# Patient Record
Sex: FEMALE | Race: WHITE | Employment: STUDENT | URBAN - METROPOLITAN AREA
[De-identification: names, ages, dates, MRNs, and addresses within clinical notes are randomized per-mention and may not be internally consistent; named-entity substitution may affect disease eponyms.]

---
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
## 2022-09-24 NOTE — TELEPHONE ENCOUNTER
FUTURE VISIT INFORMATION      FUTURE VISIT INFORMATION:    Date: 12.20.22    Time: 9:00    [place] [facility] [place]  REFERRAL INFORMATION:    Referring provider:  Self    Reason for visit/diagnosis  allergy consult / recs in [facility] / per Pt., Sirisha    RECORDS REQUESTED FROM:       Clinic name Comments Records Status   Family Practice 12.24.21  [facility]   Derm 7.10.18  [facility]

## 2022-12-18 NOTE — PROGRESS NOTES
[facility] Dermato-allergology Note  Office visit  Encounter Date: Dec 20, 2022  ____________________________________________    CC: No chief complaint on file.      HPI:  (Dec 20, 2022)  Ms. Sirisha Mauricio is a(n) 22 year old female who presents today as new patient for allergy consultation  - last allergy tests about 15 years ago --> then  Grass pollen allergy positive  - takes now Zyrtec every night (sometimes Allegra or Claritine) and sudafed almost daily  --> manageable during the year with antihistamines, but not in summer, then severe symptoms in ENT area  - otherwise feeling well in usual state of health    Physical exam:  General: In no acute distress, well-developed, well-nourished  Eyes: no conjunctivitis  ENT: no signs of rhinitis   Pulmonary: no wheezing or coughing  Skin: Focused examination of the skin on test sites was performed = see test results below  Some flexural dermatitis on elbow's    Past Medical History:   Patient Active Problem List   Diagnosis     Vitiligo     Pre-syncope     Intrinsic eczema     No past medical history on file.    Allergies:  No Known Allergies    Medications:  Current Outpatient Medications   Medication     augmented betamethasone dipropionate (DIPROLENE-AF) 0.05 % external ointment     cetirizine (ZYRTEC) 10 MG tablet     crisaborole (EUCRISA) 2 % ointment     Pseudoephedrine HCl (SUDAFED PO)     triamcinolone (KENALOG) 0.1 % external cream     No current facility-administered medications for this visit.       Social History:  The patient is a student (history --> will study in [employer]). Patient has the following hobbies or non-occupational exposure: none    Family History:  Family History   Problem Relation Age of Onset     Allergies Mother      Allergies Maternal Grandmother      Allergies Maternal Grandfather      Allergies Brother      Allergies Sister      Thyroid Disease Sister      Allergies Brother      Melanoma No family hx of        Previous  Labs, Allergy Tests, Dermatopathology, Imaging:  none    Referred By: Referred Self, MD  No address on file     Allergy Tests:    Past Allergy Test    Order for Future Allergy Testing:    [] Outpatient  [] Inpatient: [facility]..../ Bed ....       Skin Atopy (atopic dermatitis) [x] Yes   [] No since childhood atopic dermatitis  Contact allergies:   [] Yes   [x] No ..........  Hand eczema:   [x] Yes   [] No back of hands           Leading hand:   [] R   [] L       [] Ambidextrous         Drug allergies:        [] Yes   [x] No  which?......    Urticaria/Angioedema  [] Yes   [x] No .........  Food Allergy:  [] Yes   [x] No  which?......  Pets :  [] Yes   [x] No  Which?.no problems touching pets         [x]  Rhinitis   [x] Conjunctivitis   [x] Sinusitis   [] Polyposis   [] Otitis   [] Pharyngitis         [x]  Postnasal drip    []  none  Operations:   [] Tonsils   [] Septum   [] Sinus   [] Polyposis        [] Asthma bronchiale   [] Coughing      [x]  none  Symptoms (mostly Rhinoconjunctivitis and Asthma) aggravated by:  Season   [] I   [] II   [] III   [] IV   [x]V   [x]VI   [x]VII   [x]VIII   []IX   []X   []XI   []XII     [x] perennial   Day time      [] morning   [] noon      [] evening        [] night    [x] whole day........  []  none  Location/changes    [] inside        [] outside   [] mountains    [] sea     [] others.............   [x]  none  Triggers, specific     [] animals     [] plants     [] dust              [] others ...........................    [x]  none  Triggers, others       [] work          [] psyche    [] sport            [] others .............................  [x]  none  Irritant                [] phys efforts [] smoke    [] heat/cold     [] odors  []others............... [x]  none    Order for PATCH TESTS  Reason for tests (suspected allergy): not necessary  Known previous allergies: none  Standardized panels  [] Standard panel (40 tests)  [] Preservatives & Antimicrobials (31 tests)  [] Emulsifiers &  Additives (25 tests)   [] Perfumes/Flavours & Plants (25 tests)  [] Hairdresser panel (12 tests)  [] Rubber Chemicals (22 tests)  [] Plastics (26 tests)  [] Colorants/Dyes/Food additives (20 tests)  [] Metals (implants/dental) (24 tests)  [] Local anaesthetics/NSAIDs (13 tests)  [] Antibiotics & Antimycotics (14 tests)   [] Corticosteroids (15 tests)   [] Photopatch test (62 tests)   [] others: ...      [] Patient's own products: ...    DO NOT test if chemical or biological identity is unknown!     always ask from patient the product information and safety sheets (MSDS)       Order for PRICK TESTS    Reason for tests (suspected allergy): perennial and seasonal RC  Known previous allergies: none    Standardized prick panels  [x] Atopic panel (20 tests)  [] Pediatric Panel (12 tests)  [] Milk, Meat, Eggs, Grains (20 tests)   [] Dust, Epithelia, Feathers (10 tests)  [] Fish, Seafood, Shellfish (17 tests)  [] Nuts, Beans (14 tests)  [] Spice, Vegetable, Fruit (17 tests)  [] Pollen Panel = Tree, Grass, Weed (24 tests)  [] Others: ...      [] Patient's own products: ...    DO NOT test if chemical or biological identity is unknown!     always ask from patient the product information and safety sheets (MSDS)     Standardized intradermal tests  [x] Penicillium notatum [x] Aspergillus fumigatus [x] House dust mites D.far & D. pteron  [] Cat    [] dog  [] Others: ...  [] Bee venom   [] Wasp venom  !!Specific protocol with dilutions!!       Order for Drug allergy tests (prick & Intradermal & patch tests)    [] Penicillin G  [] Ampicillin [] Cefazolin   [] Ceftriaxone   [] Ceftazidime  [] Bactrim    [] Others: ...  Order for ... as test date    Atopy Screen (Placed Dec 20, 2022)  No Substance Readings (15 min) Evaluation   POS Histamine 1mg/ml ++    NEG NaCl 0.9% -      No Substance Readings (15 min) Evaluation   1 Alternaria alternata (tenuis)  -    2 Cladosporium herbarum -    3 Aspergillus fumigatus -    4 Penicillium notatum  -    5 Dermatophagoides pteronyssinus -    6 Dermatophagoides farinae -    7 Dog epithelium (canis spp) -    8 Cat hair (charisse catus) -    9 Cockroach   (Blatella americana & germanica) -    10 Grass mix midwest   (Amanda, Orchard, Redtop, Robbie) +/++    11 Tawanda grass (sorghum halepense) -    12 Weed mix   (common Cocklebur, Lamb s quarters, rough redroot Pigweed, Dock/Sorrel) -    13 Mug wort (artemisia vulgare) -    14 Ragweed giant/short (ambrosia spp) -    15 White birch (Betula papyrifera) -    16 Tree mix 1 (Pecan, Maple BHR, Oak RVW, american [place], black Cabins) -    17 Red cedar (juniperus virginia) -    18 Tree mix 2   (white Diaz, river/red Birch, black Hanna, common Still[place], american Elm) -    19 Box elder/Maple mix (acer spp) -    20 Warners shagbark (carya ovata) -           Conclusion: sensitization to grass pollens, that would explain the problems in summer      Intradermal Testing (Placed Dec 20, 2022)  No Substance Conc.  Reading (15min)  immediate Papule [mm] / Erythema [mm] Reading   (... days)  delayed Papule [mm] / Erythema [mm] Remarks   DF Standard Dust Mite - D. Farinae 1:10 -   -    DP Standard Dust Mite - D. Pteronyssinus 1:10 -   -    A Aspergillus fumigatus  1:10 -   -    P Penicillium notatum 1:10 -   -    Conclusions:no immediate type reactions to the dust mites and molds. Patient will observe if any delayed reaction        ________________________________    Assessment & Plan:    ==> Final Diagnosis:     # atopic predisposition with    Perennial Rhinoconjunctivitis, Rhinosinusitis and postnasal drip = no clear signs for allergy to molds and dust mites    Seasonal aggravation from Justa to August = some sensitization to grass pollens    Atopic dermatitis  * chronic illness with exacerbation, progression, side effects from treatment    These conclusions are made at the best of one's knowledge and belief based on the provided evidence such as patient's history and allergy test  results and they can change over time or can be incomplete because of missing information's.    ==> Treatment Plan:  >> on atopic dermatitis about 2xweekly the Betamethasone and in between use regularly Eucrisa  >> if delayed reaction to the dust mites and molds have to discuss this with another consult  >> if all negative, might need an ENT evaluation    Procedures Performed: Allergy tests, including prick, intradermal tests    Staff:  Provider    Follow-up in Derm-Allergy clinic if necessary (if delayed type reaction)    I spent a total of 40 minutes with Sirisha Mauricio. This time was spent counseling the patient and/or coordinating care, explaining the allergy tests, performing allergy tests and assessing the clinical relevance.

## 2022-12-20 ENCOUNTER — PRE VISIT (OUTPATIENT)
Dept: ALLERGY | Facility: CLINIC | Age: 22
End: 2022-12-20

## 2022-12-20 ENCOUNTER — OFFICE VISIT (OUTPATIENT)
Dept: ALLERGY | Facility: CLINIC | Age: 22
End: 2022-12-20
Payer: COMMERCIAL

## 2022-12-20 DIAGNOSIS — H10.10 SEASONAL AND PERENNIAL ALLERGIC RHINOCONJUNCTIVITIS: ICD-10-CM

## 2022-12-20 DIAGNOSIS — J30.89 SEASONAL AND PERENNIAL ALLERGIC RHINOCONJUNCTIVITIS: ICD-10-CM

## 2022-12-20 DIAGNOSIS — R09.82 ALLERGIC RHINITIS WITH POSTNASAL DRIP: ICD-10-CM

## 2022-12-20 DIAGNOSIS — J30.2 SEASONAL AND PERENNIAL ALLERGIC RHINOCONJUNCTIVITIS: ICD-10-CM

## 2022-12-20 DIAGNOSIS — L20.89 OTHER ATOPIC DERMATITIS: Primary | ICD-10-CM

## 2022-12-20 DIAGNOSIS — J30.89 NON-SEASONAL ALLERGIC RHINITIS DUE TO OTHER ALLERGIC TRIGGER: ICD-10-CM

## 2022-12-20 DIAGNOSIS — J30.9 ALLERGIC RHINITIS WITH POSTNASAL DRIP: ICD-10-CM

## 2022-12-20 PROCEDURE — 95004 PERQ TESTS W/ALRGNC XTRCS: CPT | Performed by: DERMATOLOGY

## 2022-12-20 PROCEDURE — 99204 OFFICE O/P NEW MOD 45 MIN: CPT | Mod: 25 | Performed by: DERMATOLOGY

## 2022-12-20 PROCEDURE — 95024 IQ TESTS W/ALLERGENIC XTRCS: CPT | Performed by: DERMATOLOGY

## 2022-12-20 NOTE — Clinical Note
12/20/2022         RE: Sirisha Mauricio  [address] [place] [address]        Dear Colleague,    Thank you for referring your patient, Sirisha Mauricio, to the [facility] [place]. Please see a copy of my visit note below.    [facility] Dermato-allergology Note  Office visit  Encounter Date: Dec 20, 2022  ____________________________________________    CC: No chief complaint on file.      HPI:  (Dec 20, 2022)  Ms. Sirisha Mauricio is a(n) 22 year old female who presents today as new patient for allergy consultation  - last allergy tests about 15 years ago --> then  Grass pollen allergy positive  - takes now Zyrtec every night (sometimes Allegra or Claritine) and sudafed almost daily  --> manageable during the year with antihistamines, but not in summer, then severe symptoms in ENT area  - otherwise feeling well in usual state of health    Physical exam:  General: In no acute distress, well-developed, well-nourished  Eyes: no conjunctivitis  ENT: no signs of rhinitis   Pulmonary: no wheezing or coughing  Skin: Focused examination of the skin on test sites was performed = see test results below  Some flexural dermatitis on elboes    Past Medical History:   Patient Active Problem List   Diagnosis     Vitiligo     Pre-syncope     Intrinsic eczema     No past medical history on file.    Allergies:  No Known Allergies    Medications:  Current Outpatient Medications   Medication     augmented betamethasone dipropionate (DIPROLENE-AF) 0.05 % external ointment     cetirizine (ZYRTEC) 10 MG tablet     crisaborole (EUCRISA) 2 % ointment     Pseudoephedrine HCl (SUDAFED PO)     triamcinolone (KENALOG) 0.1 % external cream     No current facility-administered medications for this visit.       Social History:  The patient is a student (history --> will study in [employer]). Patient has the following hobbies or non-occupational exposure: none    Family History:  Family History   Problem  Relation Age of Onset     Allergies Mother      Allergies Maternal Grandmother      Allergies Maternal Grandfather      Allergies Brother      Allergies Sister      Thyroid Disease Sister      Allergies Brother      Melanoma No family hx of        Previous Labs, Allergy Tests, Dermatopathology, Imaging:  ***    Referred By: Referred Self, MD  No address on file     Allergy Tests:    Past Allergy Test    Order for Future Allergy Testing:    [] Outpatient  [] Inpatient: [facility]..../ Bed ....       Skin Atopy (atopic dermatitis) [x] Yes   [] No since childhood atopic dermatitis  Contact allergies:   [] Yes   [x] No ..........  Hand eczema:   [x] Yes   [] No back of hands           Leading hand:   [] R   [] L       [] Ambidextrous         Drug allergies:        [] Yes   [x] No  which?......    Urticaria/Angioedema  [] Yes   [x] No .........  Food Allergy:  [] Yes   [x] No  which?......  Pets :  [] Yes   [x] No  Which?.no problems touching pets         [x]  Rhinitis   [x] Conjunctivitis   [x] Sinusitis   [] Polyposis   [] Otitis   [] Pharyngitis         [x]  Postnasal drip    []  none  Operations:   [] Tonsils   [] Septum   [] Sinus   [] Polyposis        [] Asthma bronchiale   [] Coughing      [x]  none  Symptoms (mostly Rhinoconjunctivitis and Asthma) aggravated by:  Season   [] I   [] II   [] III   [] IV   [x]V   [x]VI   [x]VII   [x]VIII   []IX   []X   []XI   []XII     [x] perennial   Day time      [] morning   [] noon      [] evening        [] night    [x] whole day........  []  none  Location/changes    [] inside        [] outside   [] mountains    [] sea     [] others.............   [x]  none  Triggers, specific     [] animals     [] plants     [] dust              [] others ...........................    [x]  none  Triggers, others       [] work          [] psyche    [] sport            [] others .............................  [x]  none  Irritant                [] phys efforts [] smoke    [] heat/cold     [] odors   []others............... [x]  none    Order for PATCH TESTS  Reason for tests (suspected allergy): not necessary  Known previous allergies: none  Standardized panels  [] Standard panel (40 tests)  [] Preservatives & Antimicrobials (31 tests)  [] Emulsifiers & Additives (25 tests)   [] Perfumes/Flavours & Plants (25 tests)  [] Hairdresser panel (12 tests)  [] Rubber Chemicals (22 tests)  [] Plastics (26 tests)  [] Colorants/Dyes/Food additives (20 tests)  [] Metals (implants/dental) (24 tests)  [] Local anaesthetics/NSAIDs (13 tests)  [] Antibiotics & Antimycotics (14 tests)   [] Corticosteroids (15 tests)   [] Photopatch test (62 tests)   [] others: ...      [] Patient's own products: ...    DO NOT test if chemical or biological identity is unknown!     always ask from patient the product information and safety sheets (MSDS)       Order for PRICK TESTS    Reason for tests (suspected allergy): perennial and seasonal RC  Known previous allergies: none    Standardized prick panels  [x] Atopic panel (20 tests)  [] Pediatric Panel (12 tests)  [] Milk, Meat, Eggs, Grains (20 tests)   [] Dust, Epithelia, Feathers (10 tests)  [] Fish, Seafood, Shellfish (17 tests)  [] Nuts, Beans (14 tests)  [] Spice, Vegetable, Fruit (17 tests)  [] Pollen Panel = Tree, Grass, Weed (24 tests)  [] Others: ...      [] Patient's own products: ...    DO NOT test if chemical or biological identity is unknown!     always ask from patient the product information and safety sheets (MSDS)     Standardized intradermal tests  [x] Penicillium notatum [x] Aspergillus fumigatus [x] House dust mites D.far & D. pteron  [] Cat    [] dog  [] Others: ...  [] Bee venom   [] Wasp venom  !!Specific protocol with dilutions!!       Order for Drug allergy tests (prick & Intradermal & patch tests)    [] Penicillin G  [] Ampicillin [] Cefazolin   [] Ceftriaxone   [] Ceftazidime  [] Bactrim    [] Others: ...  Order for ... as test date    Atopy Screen (Placed Dec 20,  2022)  No Substance Readings (15 min) Evaluation   POS Histamine 1mg/ml ++    NEG NaCl 0.9% -      No Substance Readings (15 min) Evaluation   1 Alternaria alternata (tenuis)  -    2 Cladosporium herbarum -    3 Aspergillus fumigatus -    4 Penicillium notatum -    5 Dermatophagoides pteronyssinus -    6 Dermatophagoides farinae -    7 Dog epithelium (canis spp) -    8 Cat hair (charisse catus) -    9 Cockroach   (Blatella americana & germanica) -    10 Grass mix midwest   (Amanda, Orchard, Redtop, Robbie) +/++    11 Tawanda grass (sorghum halepense) -    12 Weed mix   (common Cocklebur, Lamb s quarters, rough redroot Pigweed, Dock/Sorrel) -    13 Mug wort (artemisia vulgare) -    14 Ragweed giant/short (ambrosia spp) -    15 White birch (Betula papyrifera) -    16 Tree mix 1 (Pecan, Maple BHR, Oak RVW, american [place], black [place]) -    17 Red cedar (juniperus virginia) -    18 Tree mix 2   (white Diaz, river/red Birch, black Skytop, common Letart, american Elm) -    19 Box elder/Maple mix (acer spp) -    20 Louisa shagbark (carya ovata) -           Conclusion: sensitization to grass pollens, that would explain the problems in summer      Intradermal Testing (Placed Dec 20, 2022)  No Substance Conc.  Reading (15min)  immediate Papule [mm] / Erythema [mm] Reading   (... days)  delayed Papule [mm] / Erythema [mm] Remarks   DF Standard Dust Mite - D. Farinae 1:10 -   -    DP Standard Dust Mite - D. Pteronyssinus 1:10 -   -    A Aspergillus fumigatus  1:10 -   -    P Penicillium notatum 1:10 -   -      1:10 -   -      1:10 -   -    Conclusions:no immediate type reactions to the dust mites and molds. Patient will observe if any delayed reaction        ________________________________    Assessment & Plan:    ==> Final Diagnosis:     # atopic predisposition with    Perennial Rhinoconjunctivitis, Rhinosinusitis and postnasal drip = no clear signs for allergy to molds and dust mites    Seasonal aggravation from Justa to  August = some sensitization to grass pollens    Atopic dermatitis  {[id number]}    These conclusions are made at the best of one's knowledge and belief based on the provided evidence such as patient's history and allergy test results and they can change over time or can be incomplete because of missing information's.    ==> Treatment Plan:  >> on atopic dermatitis about 2xweekly the Betamethasone and in between use regularly Eucrisa  >> if delayed reaction to the dust mites and molds have to discuss this with another consult  >> if all negative, might need an ENT evaluation    Procedures Performed: Allergy tests, including prick, intradermal and patch tests, drug allergy or provocation tests***    {[id number]} Provider    Follow-up in Derm-Allergy clinic ***    I spent a total of 40 minutes with Sirisha Mauricio. This time was spent counseling the patient and/or coordinating care, explaining the allergy tests, performing allergy tests and assessing the clinical relevance.        Again, thank you for allowing me to participate in the care of your patient.        Sincerely,        Earl Brown MD

## 2022-12-20 NOTE — NURSING NOTE
Chief Complaint   Patient presents with     Allergy Consult     Pt is here for an allergy consult. Self referral. Pt hoping for allergy testing - reports systemic allergies and eczema year round. Hx allergy testing positive to grasses many years ago. Took allegra and sudafed for flight Friday, otherwise no antihistamine use x1 week.      Arti VAUGHN RN

## 2023-01-22 ENCOUNTER — HEALTH MAINTENANCE LETTER (OUTPATIENT)
Age: 23
End: 2023-01-22

## 2023-05-26 NOTE — PROGRESS NOTES
Chief Complaint   Patient presents with     Ent Problem     C/o constant sneezing, headaches, watery eyes - worse in the spring and summer- allergy testing done 12/20/22- patient has never had sinus infection to her knowledge     History of Present Illness   Sirisha Mauricio is a 22 year old female who presents today for evaluation.  The patient describes symptoms of runny nose, lacrimation, sneezing followed by significant headache and face pressure as well as lethargy.  The symptoms will last the better part of the day.  The patient will then have normalization of symptoms by the following day most of the time.  She denies any significant nasal obstruction.  She does have intermittent rhinorrhea.  She denies any significant postnasal drainage, taste or smell disturbance, or previous nose or sinus surgery.  No personal history of migraine headache, there is a family history of migraine.  She underwent allergy evaluation with some grass pollens on her allergy testing.  She is been taking daily antihistamine without much benefit.  She is been intermittently using the topical nasal antihistamine without much benefit.  She does seem to get benefit when she takes Sudafed with acute symptoms.  She has not had any nose or sinus imaging.  There is a family history of nasal polyps.      Past Medical History  Patient Active Problem List   Diagnosis     Vitiligo     Pre-syncope     Intrinsic eczema     Current Medications     Current Outpatient Medications:      cetirizine (ZYRTEC) 10 MG tablet, Take 10 mg by mouth daily, Disp: , Rfl:      crisaborole (EUCRISA) 2 % ointment, Apply topically 2 times daily, Disp: 60 g, Rfl: 3     Pseudoephedrine HCl (SUDAFED PO), Take by mouth daily, Disp: , Rfl:      SUMAtriptan (IMITREX) 50 MG tablet, Take 1 tablet (50 mg) by mouth at onset of headache for migraine May repeat in 2 hours. Max 4 tablets/24 hours., Disp: 9 tablet, Rfl: 1    Allergies  No Known Allergies    Social History   Social  History     Socioeconomic History     Marital status: Single   Tobacco Use     Smoking status: Never     Smokeless tobacco: Never   Vaping Use     Vaping status: Never Used   Substance and Sexual Activity     Alcohol use: No     Drug use: No     Sexual activity: Never       Family History  Family History   Problem Relation Age of Onset     Allergies Mother      Allergies Sister      Thyroid Disease Sister      Allergies Brother      Allergies Brother      Allergies Maternal Grandmother      Asthma Maternal Grandmother      Allergies Maternal Grandfather      Asthma Maternal Grandfather      Melanoma No family hx of        Review of Systems  As per HPI and PMHx, otherwise 10+ comprehensive system review is negative.    Physical Exam  /71 (BP Location: Right arm, Patient Position: Chair, Cuff Size: Adult Regular)   Pulse 85   Temp 98.7  F (37.1  C) (Tympanic)   Wt 56.7 kg (125 lb)   BMI 20.68 kg/m    GENERAL: Patient is a pleasant, cooperative 22 year old female in no acute distress.  HEAD: Normocephalic, atraumatic.  Hair and scalp are normal.  EYES: Pupils are equal, round, reactive to light and accommodation.  Extraocular movements are intact.  The sclera nonicteric without injection.  The extraocular structures are normal.  EARS: Normal shape and symmetry.  No tenderness when palpating the mastoid or tragal areas bilaterally.  Otoscopic exam reveals a minimal amount of cerumen bilaterally.  The bilateral tympanic membranes are round, intact without evidence of effusion, good landmarks.  No retraction, granulation, or drainage.  NOSE: Nares are patent.  Nasal mucosa is pink and moist.  Negative anterior rhinoscopy.  NEUROLOGIC: Cranial nerves II through XII are grossly intact.  Voice is strong.  Patient is House-Brackmann I/VI bilaterally.  CARDIOVASCULAR: Extremities are warm and well-perfused.  No significant peripheral edema.  RESPIRATORY: Patient has nonlabored breathing without cough, wheeze,  stridor.  PSYCHIATRIC: Patient is alert and oriented.  Mood and affect appear normal.  SKIN: Warm and dry.  No scalp, face, or neck lesions noted.    Procedure: Flexible Nasal Endoscopy  Indication: Intermittent runny nose, pressure, congestion    To best visualize the sinonasal anatomy and due to the chief complaint and HPI, I proceeded with flexible fiberoptic nasal endoscopy.  The bilateral nasal cavities were anesthetized and decongested with a mixture of lidocaine and neosynephrine.  The bilateral nasal cavities were then examined using flexible fiberoptic nasal endoscope.  The right nasal cavity was without masses, polyps, or mucopurulence.  The right middle turbinate and middle meatus was normal in appearance.  The sphenoethmoid recess was clear.  The left nasal cavity was without masses, polyps, or mucopurulence.  The left middle turbinate and middle meatus was normal in appearance.  The sphenoethmoid recess was clear.  The sinonasal mucosa appeared normal.  The nasal septum is midline.  The nasopharynx had a normal appearance with normal Eustachian tube openings and fossa of Rosenmuller bilaterally.  Minimal adenoid tissue.  The scope was removed.  The patient tolerated the procedure well.    Assessment and Plan     ICD-10-CM    1. Runny nose  R09.89 NASAL ENDOSCOPY, DIAGNOSTIC     SUMAtriptan (IMITREX) 50 MG tablet     Adult Neurology  Referral      2. Watery eyes  H04.203 NASAL ENDOSCOPY, DIAGNOSTIC     SUMAtriptan (IMITREX) 50 MG tablet     Adult Neurology  Referral      3. Sneezing  R06.7 NASAL ENDOSCOPY, DIAGNOSTIC     SUMAtriptan (IMITREX) 50 MG tablet     Adult Neurology  Referral      4. Facial pressure  R44.8       5. Nonintractable episodic headache, unspecified headache type  R51.9         It was my pleasure seeing Sirisha Mauricio today in clinic.  The patient presents with episodic runny nose, watery eyes, sneezing, face pain and pressure.  The periodicity and the  pattern of symptoms on a near weekly basis do not seem to fit with a sinus or allergy diagnosis.  It is possible that she could be having runny nose, nasal congestion, watery eyes as a prodrome or migraine aura which culminates in headaches/facial pressure that last the better part of the day and then resolved by the following day.  Symptoms do not seem to have a seasonal variability.  Symptoms have been attributed to allergies but allergy interventions have not significantly helped in the past.     I do think I would recommend CT imaging of the sinuses prior to any treatment with symptoms to see if there is any obvious sinus component.  We discussed a trial of Imitrex at the time of symptom onset they can be repeated 2 hours later if not improved.  I did place a referral to neurology for further evaluation as this is likely a headache condition.    Javier Zayas MD  Department of Otolaryngology-Head and Neck Surgery  [facility]

## 2023-05-31 ENCOUNTER — OFFICE VISIT (OUTPATIENT)
Dept: OTOLARYNGOLOGY | Facility: CLINIC | Age: 23
End: 2023-05-31
Payer: COMMERCIAL

## 2023-05-31 VITALS
SYSTOLIC BLOOD PRESSURE: 109 MMHG | BODY MASS INDEX: 20.68 KG/M2 | HEART RATE: 85 BPM | TEMPERATURE: 98.7 F | DIASTOLIC BLOOD PRESSURE: 71 MMHG | WEIGHT: 125 LBS

## 2023-05-31 DIAGNOSIS — R51.9 NONINTRACTABLE EPISODIC HEADACHE, UNSPECIFIED HEADACHE TYPE: ICD-10-CM

## 2023-05-31 DIAGNOSIS — R06.7 SNEEZING: ICD-10-CM

## 2023-05-31 DIAGNOSIS — R44.8 FACIAL PRESSURE: ICD-10-CM

## 2023-05-31 DIAGNOSIS — R09.89 RUNNY NOSE: Primary | ICD-10-CM

## 2023-05-31 DIAGNOSIS — H04.203 WATERY EYES: ICD-10-CM

## 2023-05-31 PROCEDURE — 99204 OFFICE O/P NEW MOD 45 MIN: CPT | Mod: 25 | Performed by: OTOLARYNGOLOGY

## 2023-05-31 PROCEDURE — 31231 NASAL ENDOSCOPY DX: CPT | Performed by: OTOLARYNGOLOGY

## 2023-05-31 RX ORDER — SUMATRIPTAN 50 MG/1
50 TABLET, FILM COATED ORAL
Qty: 9 TABLET | Refills: 1 | Status: SHIPPED | OUTPATIENT
Start: 2023-05-31 | End: 2023-07-03

## 2023-05-31 ASSESSMENT — PAIN SCALES - GENERAL: PAINLEVEL: NO PAIN (0)

## 2023-05-31 NOTE — LETTER
5/31/2023         RE: Sirisha Mauricio  [address] [place] [address]        Dear Colleague,    Thank you for referring your patient, Sirisha Mauricio, to the [facility]. Please see a copy of my visit note below.    Chief Complaint   Patient presents with     Ent Problem     C/o constant sneezing, headaches, watery eyes - worse in the spring and summer- allergy testing done 12/20/22- patient has never had sinus infection to her knowledge     History of Present Illness   Sirisha Mauricio is a 22 year old female who presents today for evaluation.  The patient describes symptoms of runny nose, lacrimation, sneezing followed by significant headache and face pressure as well as lethargy.  The symptoms will last the better part of the day.  The patient will then have normalization of symptoms by the following day most of the time.  She denies any significant nasal obstruction.  She does have intermittent rhinorrhea.  She denies any significant postnasal drainage, taste or smell disturbance, or previous nose or sinus surgery.  No personal history of migraine headache, there is a family history of migraine.  She underwent allergy evaluation with some grass pollens on her allergy testing.  She is been taking daily antihistamine without much benefit.  She is been intermittently using the topical nasal antihistamine without much benefit.  She does seem to get benefit when she takes Sudafed with acute symptoms.  She has not had any nose or sinus imaging.  There is a family history of nasal polyps.      Past Medical History  Patient Active Problem List   Diagnosis     Vitiligo     Pre-syncope     Intrinsic eczema     Current Medications     Current Outpatient Medications:      cetirizine (ZYRTEC) 10 MG tablet, Take 10 mg by mouth daily, Disp: , Rfl:      crisaborole (EUCRISA) 2 % ointment, Apply topically 2 times daily, Disp: 60 g, Rfl: 3     Pseudoephedrine HCl (SUDAFED PO), Take by mouth daily, Disp: ,  Rfl:      SUMAtriptan (IMITREX) 50 MG tablet, Take 1 tablet (50 mg) by mouth at onset of headache for migraine May repeat in 2 hours. Max 4 tablets/24 hours., Disp: 9 tablet, Rfl: 1    Allergies  No Known Allergies    Social History   Social History     Socioeconomic History     Marital status: Single   Tobacco Use     Smoking status: Never     Smokeless tobacco: Never   Vaping Use     Vaping status: Never Used   Substance and Sexual Activity     Alcohol use: No     Drug use: No     Sexual activity: Never       Family History  Family History   Problem Relation Age of Onset     Allergies Mother      Allergies Sister      Thyroid Disease Sister      Allergies Brother      Allergies Brother      Allergies Maternal Grandmother      Asthma Maternal Grandmother      Allergies Maternal Grandfather      Asthma Maternal Grandfather      Melanoma No family hx of        Review of Systems  As per HPI and PMHx, otherwise 10+ comprehensive system review is negative.    Physical Exam  /71 (BP Location: Right arm, Patient Position: Chair, Cuff Size: Adult Regular)   Pulse 85   Temp 98.7  F (37.1  C) (Tympanic)   Wt 56.7 kg (125 lb)   BMI 20.68 kg/m    GENERAL: Patient is a pleasant, cooperative 22 year old female in no acute distress.  HEAD: Normocephalic, atraumatic.  Hair and scalp are normal.  EYES: Pupils are equal, round, reactive to light and accommodation.  Extraocular movements are intact.  The sclera nonicteric without injection.  The extraocular structures are normal.  EARS: Normal shape and symmetry.  No tenderness when palpating the mastoid or tragal areas bilaterally.  Otoscopic exam reveals a minimal amount of cerumen bilaterally.  The bilateral tympanic membranes are round, intact without evidence of effusion, good landmarks.  No retraction, granulation, or drainage.  NOSE: Nares are patent.  Nasal mucosa is pink and moist.  Negative anterior rhinoscopy.  NEUROLOGIC: Cranial nerves II through XII are  grossly intact.  Voice is strong.  Patient is House-Brackmann I/VI bilaterally.  CARDIOVASCULAR: Extremities are warm and well-perfused.  No significant peripheral edema.  RESPIRATORY: Patient has nonlabored breathing without cough, wheeze, stridor.  PSYCHIATRIC: Patient is alert and oriented.  Mood and affect appear normal.  SKIN: Warm and dry.  No scalp, face, or neck lesions noted.    Procedure: Flexible Nasal Endoscopy  Indication: Intermittent runny nose, pressure, congestion    To best visualize the sinonasal anatomy and due to the chief complaint and HPI, I proceeded with flexible fiberoptic nasal endoscopy.  The bilateral nasal cavities were anesthetized and decongested with a mixture of lidocaine and neosynephrine.  The bilateral nasal cavities were then examined using flexible fiberoptic nasal endoscope.  The right nasal cavity was without masses, polyps, or mucopurulence.  The right middle turbinate and middle meatus was normal in appearance.  The sphenoethmoid recess was clear.  The left nasal cavity was without masses, polyps, or mucopurulence.  The left middle turbinate and middle meatus was normal in appearance.  The sphenoethmoid recess was clear.  The sinonasal mucosa appeared normal.  The nasal septum is midline.  The nasopharynx had a normal appearance with normal Eustachian tube openings and fossa of Rosenmuller bilaterally.  Minimal adenoid tissue.  The scope was removed.  The patient tolerated the procedure well.    Assessment and Plan     ICD-10-CM    1. Runny nose  R09.89 NASAL ENDOSCOPY, DIAGNOSTIC     SUMAtriptan (IMITREX) 50 MG tablet     Adult Neurology  Referral      2. Watery eyes  H04.203 NASAL ENDOSCOPY, DIAGNOSTIC     SUMAtriptan (IMITREX) 50 MG tablet     Adult Neurology  Referral      3. Sneezing  R06.7 NASAL ENDOSCOPY, DIAGNOSTIC     SUMAtriptan (IMITREX) 50 MG tablet     Adult Neurology  Referral      4. Facial pressure  R44.8       5. Nonintractable  episodic headache, unspecified headache type  R51.9         It was my pleasure seeing Sirisha Mauricio today in clinic.  The patient presents with episodic runny nose, watery eyes, sneezing, face pain and pressure.  The periodicity and the pattern of symptoms on a near weekly basis do not seem to fit with a sinus or allergy diagnosis.  It is possible that she could be having runny nose, nasal congestion, watery eyes as a prodrome or migraine aura which culminates in headaches/facial pressure that last the better part of the day and then resolved by the following day.  Symptoms do not seem to have a seasonal variability.  Symptoms have been attributed to allergies but allergy interventions have not significantly helped in the past.     I do think I would recommend CT imaging of the sinuses prior to any treatment with symptoms to see if there is any obvious sinus component.  We discussed a trial of Imitrex at the time of symptom onset they can be repeated 2 hours later if not improved.  I did place a referral to neurology for further evaluation as this is likely a headache condition.    Javier Zayas MD  Department of Otolaryngology-Head and Neck Surgery  [facility]         Again, thank you for allowing me to participate in the care of your patient.        Sincerely,        Javier Zayas MD

## 2023-05-31 NOTE — NURSING NOTE
"Initial /71 (BP Location: Right arm, Patient Position: Chair, Cuff Size: Adult Regular)   Pulse 85   Temp 98.7  F (37.1  C) (Tympanic)   Wt 56.7 kg (125 lb)   BMI 20.68 kg/m   Estimated body mass index is 20.68 kg/m  as calculated from the following:    Height as of 12/24/21: 1.656 m (5' 5.2\").    Weight as of this encounter: 56.7 kg (125 lb). .    Alissa Jaramillo LPN    "

## 2023-05-31 NOTE — PATIENT INSTRUCTIONS
Per physician's instructions    RECURRENT ACUTE SINUSITIS INSTRUCTIONS    Contact office or schedule sinus CT PRIOR to receiving antibiotics.

## 2023-06-02 ENCOUNTER — MYC MEDICAL ADVICE (OUTPATIENT)
Dept: OTOLARYNGOLOGY | Facility: CLINIC | Age: 23
End: 2023-06-02
Payer: COMMERCIAL

## 2023-06-02 ENCOUNTER — HOSPITAL ENCOUNTER (OUTPATIENT)
Dept: CT IMAGING | Facility: HOSPITAL | Age: 23
Discharge: HOME | End: 2023-06-02
Attending: OTOLARYNGOLOGY | Admitting: OTOLARYNGOLOGY
Payer: COMMERCIAL

## 2023-06-02 DIAGNOSIS — R44.8 FACIAL PRESSURE: ICD-10-CM

## 2023-06-02 DIAGNOSIS — R51.9 NONINTRACTABLE EPISODIC HEADACHE, UNSPECIFIED HEADACHE TYPE: ICD-10-CM

## 2023-06-02 DIAGNOSIS — H04.203 WATERY EYES: ICD-10-CM

## 2023-06-02 DIAGNOSIS — R09.89 RUNNY NOSE: ICD-10-CM

## 2023-06-02 DIAGNOSIS — R06.7 SNEEZING: ICD-10-CM

## 2023-06-02 DIAGNOSIS — R09.89 RUNNY NOSE: Primary | ICD-10-CM

## 2023-06-02 PROCEDURE — 70486 CT MAXILLOFACIAL W/O DYE: CPT

## 2023-06-02 NOTE — TELEPHONE ENCOUNTER
Let patient know that the CT has been order as a STAT and gave her the number to call and schedule.  Patient had no other questions at this time.    Mirta Dale LPN on 6/2/2023 at 2:21 PM

## 2023-07-03 ENCOUNTER — OFFICE VISIT (OUTPATIENT)
Dept: OTOLARYNGOLOGY | Facility: CLINIC | Age: 23
End: 2023-07-03
Payer: COMMERCIAL

## 2023-07-03 VITALS
WEIGHT: 125 LBS | TEMPERATURE: 98.6 F | SYSTOLIC BLOOD PRESSURE: 119 MMHG | DIASTOLIC BLOOD PRESSURE: 69 MMHG | HEART RATE: 94 BPM | BODY MASS INDEX: 20.68 KG/M2

## 2023-07-03 DIAGNOSIS — J32.1 CHRONIC FRONTAL SINUSITIS: ICD-10-CM

## 2023-07-03 DIAGNOSIS — J34.3 HYPERTROPHY OF BOTH INFERIOR NASAL TURBINATES: ICD-10-CM

## 2023-07-03 DIAGNOSIS — R44.8 FACIAL PRESSURE: ICD-10-CM

## 2023-07-03 DIAGNOSIS — J32.2 CHRONIC ETHMOIDAL SINUSITIS: ICD-10-CM

## 2023-07-03 DIAGNOSIS — J32.0 CHRONIC MAXILLARY SINUSITIS: Primary | ICD-10-CM

## 2023-07-03 PROCEDURE — 96372 THER/PROPH/DIAG INJ SC/IM: CPT | Performed by: OTOLARYNGOLOGY

## 2023-07-03 PROCEDURE — 99214 OFFICE O/P EST MOD 30 MIN: CPT | Mod: 25 | Performed by: OTOLARYNGOLOGY

## 2023-07-03 RX ORDER — TRIAMCINOLONE ACETONIDE 40 MG/ML
80 INJECTION, SUSPENSION INTRA-ARTICULAR; INTRAMUSCULAR ONCE
Status: COMPLETED | OUTPATIENT
Start: 2023-07-03 | End: 2023-07-03

## 2023-07-03 RX ORDER — BUDESONIDE 0.5 MG/2ML
INHALANT ORAL
Qty: 360 ML | Refills: 3 | Status: SHIPPED | OUTPATIENT
Start: 2023-07-03

## 2023-07-03 RX ORDER — LORATADINE 10 MG/1
10 TABLET ORAL DAILY
COMMUNITY
End: 2023-08-07

## 2023-07-03 RX ADMIN — TRIAMCINOLONE ACETONIDE 80 MG: 40 INJECTION, SUSPENSION INTRA-ARTICULAR; INTRAMUSCULAR at 12:52

## 2023-07-03 ASSESSMENT — PAIN SCALES - GENERAL: PAINLEVEL: NO PAIN (0)

## 2023-07-03 NOTE — NURSING NOTE
"Initial /69 (BP Location: Right arm, Patient Position: Chair, Cuff Size: Adult Regular)   Pulse 94   Temp 98.6  F (37  C) (Tympanic)   Wt 56.7 kg (125 lb)   BMI 20.68 kg/m   Estimated body mass index is 20.68 kg/m  as calculated from the following:    Height as of 12/24/21: 1.656 m (5' 5.2\").    Weight as of this encounter: 56.7 kg (125 lb). .    Alissa Jaramillo LPN    "

## 2023-07-03 NOTE — LETTER
7/3/2023         RE: Sirisha Mauricio  [address] [place] [address]        Dear Colleague,    Thank you for referring your patient, Sirisha Mauricio, to the [facility]. Please see a copy of my visit note below.    Chief Complaint   Patient presents with     Ent Problem     Go over CT results     History of Present Illness  Sirisha Mauricio is a 22 year old female who presents today for follow-up.  I evaluated the patient on 5/31/2023 with a several month history of runny nose, lacrimation, sneezing followed by significant headache and face pressure as well as lethargy.  She was having issues with congestion, intermittent rhinorrhea, nasal obstruction.  She denies any significant taste or smell changes, postnasal drainage.  She was struggling with facial pressure and headache.  She does not have a personal history of migraine headache.  She was getting some benefit when she was using Sudafed.  She underwent allergy testing with minimal positives, some grass pollens.  There is family history of nasal polyps.    We discussed obtaining CT imaging when the patient's symptoms were at peak.  The patient underwent CT imaging of her sinuses on 6/2/2023.  My review of the sinus CT shows sinus mucosal thickening without significant air-fluid levels in both maxillary sinuses.  There is obstruction of the bilateral ostiomeatal units.  There is scattered moderate thickening in both ethmoid systems.  There is a slight amount of thickening in the left frontal sinus outflow tract.  The sphenoid sinuses are relatively well aerated.  The nasal septum is essentially midline.  I placed her on a 21-day course of Bactrim and a burst and taper of prednisone.  The patient had significant interval improvement in her symptoms when she was on the prednisone and the antibiotic but eventually had resurgence of her symptoms.  Today she is quite symptomatic and is struggling with congestion, nasal obstruction,  rhinorrhea.    Past Medical History  Patient Active Problem List   Diagnosis     Vitiligo     Pre-syncope     Intrinsic eczema     Current Medications    Current Outpatient Medications:      budesonide (PULMICORT) 0.5 MG/2ML neb solution, Place 0.5 mg/2 mL budesonide vial in 8 oz normal saline sinus rinse bottle.  Irrigate each nostril with one half of the bottle twice daily., Disp: 360 mL, Rfl: 3     cetirizine (ZYRTEC) 10 MG tablet, Take 10 mg by mouth daily, Disp: , Rfl:      crisaborole (EUCRISA) 2 % ointment, Apply topically 2 times daily, Disp: 60 g, Rfl: 3     loratadine (CLARITIN) 10 MG tablet, Take 10 mg by mouth daily, Disp: , Rfl:      Pseudoephedrine HCl (SUDAFED PO), Take by mouth daily, Disp: , Rfl:   No current facility-administered medications for this visit.    Allergies  No Known Allergies    Social History  Social History     Socioeconomic History     Marital status: Single     Spouse name: None     Number of children: None     Years of education: None     Highest education level: None   Tobacco Use     Smoking status: Never     Smokeless tobacco: Never   Vaping Use     Vaping Use: Never used   Substance and Sexual Activity     Alcohol use: No     Drug use: No     Sexual activity: Never       Family History  Family History   Problem Relation Age of Onset     Allergies Mother      Allergies Sister      Thyroid Disease Sister      Allergies Brother      Allergies Brother      Allergies Maternal Grandmother      Asthma Maternal Grandmother      Allergies Maternal Grandfather      Asthma Maternal Grandfather      Melanoma No family hx of        Review of Systems  As per HPI and PMHx, otherwise 10 system review including the head and neck, constitutional, eyes, respiratory, GI, skin, neurologic, lymphatic, endocrine, and allergy systems is negative.    Physical Exam  /69 (BP Location: Right arm, Patient Position: Chair, Cuff Size: Adult Regular)   Pulse 94   Temp 98.6  F (37  C) (Tympanic)    Wt 56.7 kg (125 lb)   BMI 20.68 kg/m    GENERAL: Patient is a pleasant, cooperative 22 year old female in no acute distress.  HEAD: Normocephalic, atraumatic.  Hair and scalp are normal.  EYES: Pupils are equal, round, reactive to light and accommodation.  Extraocular movements are intact.  The sclera nonicteric without injection.  The extraocular structures are normal.  EARS: Normal shape and symmetry.  No tenderness when palpating the mastoid or tragal areas bilaterally.    NOSE: Nares are patent.  Nasal mucosa is congested, boggy and inflamed with sticky, inflammatory mucus.  Moderate inferior turbinate hypertrophy.  NEUROLOGIC: Cranial nerves II through XII are grossly intact.  Voice is strong.  Patient is House-Brackmann I/VI bilaterally.  CARDIOVASCULAR: Extremities are warm and well-perfused.  No significant peripheral edema.  RESPIRATORY: Patient has nonlabored breathing without cough, wheeze, stridor.  PSYCHIATRIC: Patient is alert and oriented.  Mood and affect appear normal.  SKIN: Warm and dry.  No scalp, face, or neck lesions noted.    Assessment and Plan     ICD-10-CM    1. Chronic maxillary sinusitis  J32.0 triamcinolone (KENALOG-40) injection 80 mg     budesonide (PULMICORT) 0.5 MG/2ML neb solution      2. Chronic ethmoidal sinusitis  J32.2 triamcinolone (KENALOG-40) injection 80 mg     budesonide (PULMICORT) 0.5 MG/2ML neb solution      3. Chronic frontal sinusitis  J32.1 triamcinolone (KENALOG-40) injection 80 mg     budesonide (PULMICORT) 0.5 MG/2ML neb solution      4. Facial pressure  R44.8 triamcinolone (KENALOG-40) injection 80 mg     budesonide (PULMICORT) 0.5 MG/2ML neb solution      5. Hypertrophy of both inferior nasal turbinates  J34.3          It was my pleasure seeing Sirisha Mauricio today in clinic.  The patient presents with chronic sinusitis without nasal polyposis.  She had initial improvement after a course of antibiotics and steroids, but does have progression of her  symptoms.    We discussed the pathophysiology of chronic sinusitis.  We discussed medical and surgical management.  I feel the patient would benefit from nasal saline irrigations with Budesonide.  We discussed proper nasal saline irrigation technique with Budesonide.  The patient would also benefit from an 80 mg intramuscular injection of Kenalog injection today in office.  We discussed the risks, benefits, options, goals of a intramuscular Kenalog injection today in office including, but not limited to: Risk of pain at injection site, risk of infection, side effects of systemic steroids including blood pressure problems, insulin resistance, weight gain, bone/joint issues, mood alteration.  Patient voiced understanding and is willing to proceed.    Based on her CT imaging and persistent symptoms, she would be a candidate for endoscopic sinus surgery.    We discussed the risks, benefits, alternatives, options of bilateral endoscopic maxillary antrostomies, bilateral endoscopic total ethmoidectomies, bilateral submucous resection of the inferior turbinates, possible left endoscopic frontal sinusotomy, image guidance including, but not limited to: Risk of bleeding, risk of infection, risk of postoperative pain, risk of injury to the orbital contents, risk of CSF leak, risk of intranasal scarring or adhesions, risk of smell disturbance or smell loss, potential need for additional procedures, risk of general anesthesia.  The patient voiced understanding and is willing to proceed.  We discussed the postoperative course and convalescence including lifting and activity restrictions, nasal saline irrigations postoperatively, and postoperative debridement.    The patient would like to think about her options, she is leaving for school at the end of August and would potentially have enough time to have surgery in convalesce prior to leaving versus having her consider surgery when she returns.    Javier Zayas MD  Department  of Otolaryngology-Head and Neck Surgery  [facility]         Again, thank you for allowing me to participate in the care of your patient.        Sincerely,        Javier Zayas MD

## 2023-07-03 NOTE — PROGRESS NOTES
Chief Complaint   Patient presents with     Ent Problem     Go over CT results     History of Present Illness  Sirisha Mauricio is a 22 year old female who presents today for follow-up.  I evaluated the patient on 5/31/2023 with a several month history of runny nose, lacrimation, sneezing followed by significant headache and face pressure as well as lethargy.  She was having issues with congestion, intermittent rhinorrhea, nasal obstruction.  She denies any significant taste or smell changes, postnasal drainage.  She was struggling with facial pressure and headache.  She does not have a personal history of migraine headache.  She was getting some benefit when she was using Sudafed.  She underwent allergy testing with minimal positives, some grass pollens.  There is family history of nasal polyps.    We discussed obtaining CT imaging when the patient's symptoms were at peak.  The patient underwent CT imaging of her sinuses on 6/2/2023.  My review of the sinus CT shows sinus mucosal thickening without significant air-fluid levels in both maxillary sinuses.  There is obstruction of the bilateral ostiomeatal units.  There is scattered moderate thickening in both ethmoid systems.  There is a slight amount of thickening in the left frontal sinus outflow tract.  The sphenoid sinuses are relatively well aerated.  The nasal septum is essentially midline.  I placed her on a 21-day course of Bactrim and a burst and taper of prednisone.  The patient had significant interval improvement in her symptoms when she was on the prednisone and the antibiotic but eventually had resurgence of her symptoms.  Today she is quite symptomatic and is struggling with congestion, nasal obstruction, rhinorrhea.    Past Medical History  Patient Active Problem List   Diagnosis     Vitiligo     Pre-syncope     Intrinsic eczema     Current Medications    Current Outpatient Medications:      budesonide (PULMICORT) 0.5 MG/2ML neb solution, Place 0.5 mg/2  mL budesonide vial in 8 oz normal saline sinus rinse bottle.  Irrigate each nostril with one half of the bottle twice daily., Disp: 360 mL, Rfl: 3     cetirizine (ZYRTEC) 10 MG tablet, Take 10 mg by mouth daily, Disp: , Rfl:      crisaborole (EUCRISA) 2 % ointment, Apply topically 2 times daily, Disp: 60 g, Rfl: 3     loratadine (CLARITIN) 10 MG tablet, Take 10 mg by mouth daily, Disp: , Rfl:      Pseudoephedrine HCl (SUDAFED PO), Take by mouth daily, Disp: , Rfl:   No current facility-administered medications for this visit.    Allergies  No Known Allergies    Social History  Social History     Socioeconomic History     Marital status: Single     Spouse name: None     Number of children: None     Years of education: None     Highest education level: None   Tobacco Use     Smoking status: Never     Smokeless tobacco: Never   Vaping Use     Vaping Use: Never used   Substance and Sexual Activity     Alcohol use: No     Drug use: No     Sexual activity: Never       Family History  Family History   Problem Relation Age of Onset     Allergies Mother      Allergies Sister      Thyroid Disease Sister      Allergies Brother      Allergies Brother      Allergies Maternal Grandmother      Asthma Maternal Grandmother      Allergies Maternal Grandfather      Asthma Maternal Grandfather      Melanoma No family hx of        Review of Systems  As per HPI and PMHx, otherwise 10 system review including the head and neck, constitutional, eyes, respiratory, GI, skin, neurologic, lymphatic, endocrine, and allergy systems is negative.    Physical Exam  /69 (BP Location: Right arm, Patient Position: Chair, Cuff Size: Adult Regular)   Pulse 94   Temp 98.6  F (37  C) (Tympanic)   Wt 56.7 kg (125 lb)   BMI 20.68 kg/m    GENERAL: Patient is a pleasant, cooperative 22 year old female in no acute distress.  HEAD: Normocephalic, atraumatic.  Hair and scalp are normal.  EYES: Pupils are equal, round, reactive to light and  accommodation.  Extraocular movements are intact.  The sclera nonicteric without injection.  The extraocular structures are normal.  EARS: Normal shape and symmetry.  No tenderness when palpating the mastoid or tragal areas bilaterally.    NOSE: Nares are patent.  Nasal mucosa is congested, boggy and inflamed with sticky, inflammatory mucus.  Moderate inferior turbinate hypertrophy.  NEUROLOGIC: Cranial nerves II through XII are grossly intact.  Voice is strong.  Patient is House-Brackmann I/VI bilaterally.  CARDIOVASCULAR: Extremities are warm and well-perfused.  No significant peripheral edema.  RESPIRATORY: Patient has nonlabored breathing without cough, wheeze, stridor.  PSYCHIATRIC: Patient is alert and oriented.  Mood and affect appear normal.  SKIN: Warm and dry.  No scalp, face, or neck lesions noted.    Assessment and Plan     ICD-10-CM    1. Chronic maxillary sinusitis  J32.0 triamcinolone (KENALOG-40) injection 80 mg     budesonide (PULMICORT) 0.5 MG/2ML neb solution      2. Chronic ethmoidal sinusitis  J32.2 triamcinolone (KENALOG-40) injection 80 mg     budesonide (PULMICORT) 0.5 MG/2ML neb solution      3. Chronic frontal sinusitis  J32.1 triamcinolone (KENALOG-40) injection 80 mg     budesonide (PULMICORT) 0.5 MG/2ML neb solution      4. Facial pressure  R44.8 triamcinolone (KENALOG-40) injection 80 mg     budesonide (PULMICORT) 0.5 MG/2ML neb solution      5. Hypertrophy of both inferior nasal turbinates  J34.3          It was my pleasure seeing Sirisha Mauricio today in clinic.  The patient presents with chronic sinusitis without nasal polyposis.  She had initial improvement after a course of antibiotics and steroids, but does have progression of her symptoms.    We discussed the pathophysiology of chronic sinusitis.  We discussed medical and surgical management.  I feel the patient would benefit from nasal saline irrigations with Budesonide.  We discussed proper nasal saline irrigation technique with  Budesonide.  The patient would also benefit from an 80 mg intramuscular injection of Kenalog injection today in office.  We discussed the risks, benefits, options, goals of a intramuscular Kenalog injection today in office including, but not limited to: Risk of pain at injection site, risk of infection, side effects of systemic steroids including blood pressure problems, insulin resistance, weight gain, bone/joint issues, mood alteration.  Patient voiced understanding and is willing to proceed.    Based on her CT imaging and persistent symptoms, she would be a candidate for endoscopic sinus surgery.    We discussed the risks, benefits, alternatives, options of bilateral endoscopic maxillary antrostomies, bilateral endoscopic total ethmoidectomies, bilateral submucous resection of the inferior turbinates, possible left endoscopic frontal sinusotomy, image guidance including, but not limited to: Risk of bleeding, risk of infection, risk of postoperative pain, risk of injury to the orbital contents, risk of CSF leak, risk of intranasal scarring or adhesions, risk of smell disturbance or smell loss, potential need for additional procedures, risk of general anesthesia.  The patient voiced understanding and is willing to proceed.  We discussed the postoperative course and convalescence including lifting and activity restrictions, nasal saline irrigations postoperatively, and postoperative debridement.    The patient would like to think about her options, she is leaving for school at the end of August and would potentially have enough time to have surgery in convalesce prior to leaving versus having her consider surgery when she returns.    Javier Zayas MD  Department of Otolaryngology-Head and Neck Surgery  [facility]

## 2023-07-03 NOTE — PATIENT INSTRUCTIONS
BUDESONIDE IRRIGATION INSTRUCTIONS    You will be starting Budesonide nasal irrigations and will need to obtain the following:      - NeilMed Sinus Rinse 8 oz Kit  - Distilled or filtered water   - Normal saline salt packets  - Budesonide medication     Place filtered or distilled water into the NeilMed bottle up to the fill line (DO NOT USE TAP OR WELL WATER).  Place the pre-made salt packet in the 8 oz of saline.  Then placed the budesonide medication into the bottle.  Shake the bottle to suspend into solution.  Lean head forward over a sink or a basin.  Rinse each side of the nose with one-half of the bottle (each squeeze is about one-half of the bottle). Rinse the nose twice daily.     You will follow up with your ENT surgeon in 8-12 weeks to recheck your symptoms.  If you are having problems with your irrigations or problems obtaining the medication, please contact your ENT surgeon.    Video example: [email or web address]

## 2023-07-05 DIAGNOSIS — R44.8 FACIAL PRESSURE: ICD-10-CM

## 2023-07-05 DIAGNOSIS — J32.2 CHRONIC ETHMOIDAL SINUSITIS: ICD-10-CM

## 2023-07-05 DIAGNOSIS — J32.1 CHRONIC FRONTAL SINUSITIS: ICD-10-CM

## 2023-07-05 DIAGNOSIS — J34.3 HYPERTROPHY OF BOTH INFERIOR NASAL TURBINATES: ICD-10-CM

## 2023-07-05 DIAGNOSIS — J32.0 CHRONIC MAXILLARY SINUSITIS: Primary | ICD-10-CM

## 2023-07-06 ENCOUNTER — TELEPHONE (OUTPATIENT)
Dept: OTOLARYNGOLOGY | Facility: CLINIC | Age: 23
End: 2023-07-06

## 2023-07-06 NOTE — TELEPHONE ENCOUNTER
Type of surgery: SINUS SURGERY, ENDOSCOPIC, USING OPTICAL TRACKING SYSTEM (Bilateral)   SUBMUCOUS RESECTION OF THE INFERIOR TURBINATES (Bilateral    Location of surgery: [facility]  Date and time of surgery: 8/1  Surgeon: Marquez   Pre-Op Appt Date: 7/24  Post-Op Appt Date: 8/7   Packet sent out: Yes  Pre-cert/Authorization completed:  No  Date:

## 2023-07-06 NOTE — TELEPHONE ENCOUNTER
Left message for patient to call us back to discuss surgery dates    Alexandra Garcia M.A.  Specialty surgery Scheduler

## 2023-07-17 ENCOUNTER — OFFICE VISIT (OUTPATIENT)
Dept: FAMILY MEDICINE | Facility: CLINIC | Age: 23
End: 2023-07-17
Payer: COMMERCIAL

## 2023-07-17 VITALS
HEART RATE: 77 BPM | HEIGHT: 65 IN | RESPIRATION RATE: 16 BRPM | OXYGEN SATURATION: 99 % | TEMPERATURE: 98 F | WEIGHT: 125 LBS | DIASTOLIC BLOOD PRESSURE: 60 MMHG | SYSTOLIC BLOOD PRESSURE: 108 MMHG | BODY MASS INDEX: 20.83 KG/M2

## 2023-07-17 DIAGNOSIS — Z01.818 PRE-OP EXAM: Primary | ICD-10-CM

## 2023-07-17 DIAGNOSIS — J32.0 CHRONIC MAXILLARY SINUSITIS: ICD-10-CM

## 2023-07-17 LAB
ANION GAP SERPL CALCULATED.3IONS-SCNC: 10 MMOL/L (ref 7–15)
BUN SERPL-MCNC: 10.5 MG/DL (ref 6–20)
CALCIUM SERPL-MCNC: 9.6 MG/DL (ref 8.6–10)
CHLORIDE SERPL-SCNC: 102 MMOL/L (ref 98–107)
CREAT SERPL-MCNC: 0.68 MG/DL (ref 0.51–0.95)
DEPRECATED HCO3 PLAS-SCNC: 27 MMOL/L (ref 22–29)
ERYTHROCYTE [DISTWIDTH] IN BLOOD BY AUTOMATED COUNT: 12 % (ref 10–15)
GFR SERPL CREATININE-BSD FRML MDRD: >90 ML/MIN/1.73M2
GLUCOSE SERPL-MCNC: 115 MG/DL (ref 70–99)
HCG UR QL: NEGATIVE
HCT VFR BLD AUTO: 42.1 % (ref 35–47)
HGB BLD-MCNC: 14.7 G/DL (ref 11.7–15.7)
MCH RBC QN AUTO: 31.9 PG (ref 26.5–33)
MCHC RBC AUTO-ENTMCNC: 34.9 G/DL (ref 31.5–36.5)
MCV RBC AUTO: 91 FL (ref 78–100)
PLATELET # BLD AUTO: 297 10E3/UL (ref 150–450)
POTASSIUM SERPL-SCNC: 4 MMOL/L (ref 3.4–5.3)
RBC # BLD AUTO: 4.61 10E6/UL (ref 3.8–5.2)
SODIUM SERPL-SCNC: 139 MMOL/L (ref 136–145)
WBC # BLD AUTO: 9.7 10E3/UL (ref 4–11)

## 2023-07-17 PROCEDURE — 99214 OFFICE O/P EST MOD 30 MIN: CPT | Performed by: NURSE PRACTITIONER

## 2023-07-17 PROCEDURE — 36415 COLL VENOUS BLD VENIPUNCTURE: CPT | Performed by: NURSE PRACTITIONER

## 2023-07-17 PROCEDURE — 85027 COMPLETE CBC AUTOMATED: CPT | Performed by: NURSE PRACTITIONER

## 2023-07-17 PROCEDURE — 80048 BASIC METABOLIC PNL TOTAL CA: CPT | Performed by: NURSE PRACTITIONER

## 2023-07-17 PROCEDURE — 81025 URINE PREGNANCY TEST: CPT | Performed by: NURSE PRACTITIONER

## 2023-07-17 ASSESSMENT — PAIN SCALES - GENERAL: PAINLEVEL: NO PAIN (0)

## 2023-07-17 NOTE — PROGRESS NOTES
[facility]  [address] [place] [address]  Phone: [phone]  Fax: [phone]  Primary Provider: Mónica Linn  Pre-op Performing Provider: STEVENSON NARANJO      PREOPERATIVE EVALUATION:  Today's date: 7/17/2023    Sirisha Mauricio is a 23 year old female who presents for a preoperative evaluation.      7/17/2023     3:15 PM   Additional Questions   Roomed by Amina VAUGHN     Surgical Information:  Surgery/Procedure: SINUS SURGERY, ENDOSCOPIC, USING OPTICAL TRACKING SYSTEM, SUBMUCOUS RESECTION OF THE INFERIOR TURBINATES  Surgery Location: [facility]   Surgeon: Javier Zayas MD  Surgery Date: 8/1/23  Time of Surgery:   Where patient plans to recover: At home with family  Fax number for surgical facility: Note does not need to be faxed, will be available electronically in Epic.    Assessment & Plan     The proposed surgical procedure is considered INTERMEDIATE risk.    Pre-op exam    - CBC with platelets; Future  - Basic metabolic panel  (Ca, Cl, CO2, Creat, Gluc, K, Na, BUN); Future  - HCG Qual, Urine ([id number]); Future  - CBC with platelets  - Basic metabolic panel  (Ca, Cl, CO2, Creat, Gluc, K, Na, BUN)  - HCG Qual, Urine ([id number])    Chronic maxillary sinusitis              - No identified additional risk factors other than previously addressed    Antiplatelet or Anticoagulation Medication Instructions:   - Patient is on no antiplatelet or anticoagulation medications.    Additional Medication Instructions:   - ibuprofen (Advil, Motrin): HOLD 1 day before surgery.     RECOMMENDATION:  APPROVAL GIVEN to proceed with proposed procedure, without further diagnostic evaluation.    Subjective       HPI related to upcoming procedure: SINUS SURGERY, ENDOSCOPIC, USING OPTICAL TRACKING SYSTEM, SUBMUCOUS RESECTION OF THE INFERIOR TURBINATES        7/17/2023     3:15 PM   Preop Questions   1. Have you ever had a heart attack or stroke? No   2. Have  you ever had surgery on your heart or blood vessels, such as a stent placement, a coronary artery bypass, or surgery on an artery in your head, neck, heart, or legs? No   3. Do you have chest pain with activity? No   4. Do you have a history of  heart failure? No   5. Do you currently have a cold, bronchitis or symptoms of other infection? No   6. Do you have a cough, shortness of breath, or wheezing? No   7. Do you or anyone in your family have previous history of blood clots? No   8. Do you or does anyone in your family have a serious bleeding problem such as prolonged bleeding following surgeries or cuts? No   9. Have you ever had problems with anemia or been told to take iron pills? No   10. Have you had any abnormal blood loss such as black, tarry or bloody stools, or abnormal vaginal bleeding? No   11. Have you ever had a blood transfusion? No   12. Are you willing to have a blood transfusion if it is medically needed before, during, or after your surgery? Yes   13. Have you or any of your relatives ever had problems with anesthesia? No   14. Do you have sleep apnea, excessive snoring or daytime drowsiness? No   15. Do you have any artifical heart valves or other implanted medical devices like a pacemaker, defibrillator, or continuous glucose monitor? No   16. Do you have artificial joints? No   17. Are you allergic to latex? No   18. Is there any chance that you may be pregnant? No       Health Care Directive:  Patient does not have a Health Care Directive or Living Will:     Preoperative Review of :   reviewed - controlled substances reflected in medication list.      Status of Chronic Conditions:  See problem list for active medical problems.  Problems all longstanding and stable, except as noted/documented.  See ROS for pertinent symptoms related to these conditions.    Review of Systems  Constitutional, neuro, ENT, endocrine, pulmonary, cardiac, gastrointestinal, genitourinary, musculoskeletal,  "integument and psychiatric systems are negative, except as otherwise noted.    Patient Active Problem List    Diagnosis Date Noted    Chronic maxillary sinusitis 07/05/2023     Priority: Medium    Chronic ethmoidal sinusitis 07/05/2023     Priority: Medium    Chronic frontal sinusitis 07/05/2023     Priority: Medium    Facial pressure 07/05/2023     Priority: Medium    Hypertrophy of both inferior nasal turbinates 07/05/2023     Priority: Medium    Vitiligo 10/25/2016     Priority: Medium    Pre-syncope 10/25/2016     Priority: Medium    Intrinsic eczema 10/25/2016     Priority: Medium      No past medical history on file.  No past surgical history on file.  Current Outpatient Medications   Medication Sig Dispense Refill    budesonide (PULMICORT) 0.5 MG/2ML neb solution Place 0.5 mg/2 mL budesonide vial in 8 oz normal saline sinus rinse bottle.  Irrigate each nostril with one half of the bottle twice daily. 360 mL 3    cetirizine (ZYRTEC) 10 MG tablet Take 10 mg by mouth daily      crisaborole (EUCRISA) 2 % ointment Apply topically 2 times daily 60 g 3    loratadine (CLARITIN) 10 MG tablet Take 10 mg by mouth daily      Pseudoephedrine HCl (SUDAFED PO) Take by mouth daily         No Known Allergies     Social History     Tobacco Use    Smoking status: Never    Smokeless tobacco: Never   Substance Use Topics    Alcohol use: No     Family History   Problem Relation Age of Onset    Allergies Mother     Allergies Sister     Thyroid Disease Sister     Allergies Brother     Allergies Brother     Allergies Maternal Grandmother     Asthma Maternal Grandmother     Allergies Maternal Grandfather     Asthma Maternal Grandfather     Melanoma No family hx of      History   Drug Use No         Objective     /60 (BP Location: Right arm, Cuff Size: Adult Regular)   Pulse 77   Temp 98  F (36.7  C) (Tympanic)   Resp 16   Ht 1.651 m (5' 5\")   Wt 56.7 kg (125 lb)   SpO2 99%   BMI 20.80 kg/m      Physical Exam    GENERAL " APPEARANCE: healthy, alert and no distress     EYES: EOMI,  PERRL     HENT: ear canals and TM's normal and nose and mouth without ulcers or lesions     NECK: no adenopathy, no asymmetry, masses, or scars and thyroid normal to palpation     RESP: lungs clear to auscultation - no rales, rhonchi or wheezes     CV: regular rates and rhythm, normal S1 S2, no S3 or S4 and no murmur, click or rub     ABDOMEN:  soft, nontender, no HSM or masses and bowel sounds normal     MS: extremities normal- no gross deformities noted, no evidence of inflammation in joints, FROM in all extremities.     SKIN: no suspicious lesions or rashes     NEURO: Normal strength and tone, sensory exam grossly normal, mentation intact and speech normal     PSYCH: mentation appears normal. and affect normal/bright     LYMPHATICS: No cervical adenopathy    No results for input(s): HGB, PLT, INR, NA, POTASSIUM, CR, A1C in the last 21866 hours.     Diagnostics:  Recent Results (from the past 24 hour(s))   CBC with platelets    Collection Time: 07/17/23  3:46 PM   Result Value Ref Range    WBC Count 9.7 4.0 - 11.0 10e3/uL    RBC Count 4.61 3.80 - 5.20 10e6/uL    Hemoglobin 14.7 11.7 - 15.7 g/dL    Hematocrit 42.1 35.0 - 47.0 %    MCV 91 78 - 100 fL    MCH 31.9 26.5 - 33.0 pg    MCHC 34.9 31.5 - 36.5 g/dL    RDW 12.0 10.0 - 15.0 %    Platelet Count 297 150 - 450 10e3/uL   Basic metabolic panel  (Ca, Cl, CO2, Creat, Gluc, K, Na, BUN)    Collection Time: 07/17/23  3:46 PM   Result Value Ref Range    Sodium 139 136 - 145 mmol/L    Potassium 4.0 3.4 - 5.3 mmol/L    Chloride 102 98 - 107 mmol/L    Carbon Dioxide (CO2) 27 22 - 29 mmol/L    Anion Gap 10 7 - 15 mmol/L    Urea Nitrogen 10.5 6.0 - 20.0 mg/dL    Creatinine 0.68 0.51 - 0.95 mg/dL    Calcium 9.6 8.6 - 10.0 mg/dL    Glucose 115 (H) 70 - 99 mg/dL    GFR Estimate >90 >60 mL/min/1.73m2   HCG Qual, Urine ([id number])    Collection Time: 07/17/23  3:46 PM   Result Value Ref Range    hCG Urine Qualitative  Negative Negative      No EKG required, no history of coronary heart disease, significant arrhythmia, peripheral arterial disease or other structural heart disease.    Revised Cardiac Risk Index (RCRI):  The patient has the following serious cardiovascular risks for perioperative complications:   - No serious cardiac risks = 0 points     RCRI Interpretation: 0 points: Class I (very low risk - 0.4% complication rate)           Signed Electronically by: СЕРГЕЙ Andrew CNP  Copy of this evaluation report is provided to requesting physician.

## 2023-07-17 NOTE — H&P (VIEW-ONLY)
[facility]  [address] [place] [address]  Phone: [phone]  Fax: [phone]  Primary Provider: Mónica Linn  Pre-op Performing Provider: STEVENSON NARANJO      PREOPERATIVE EVALUATION:  Today's date: 7/17/2023    Sirisha Mauricio is a 23 year old female who presents for a preoperative evaluation.      7/17/2023     3:15 PM   Additional Questions   Roomed by Amina VAUGHN     Surgical Information:  Surgery/Procedure: SINUS SURGERY, ENDOSCOPIC, USING OPTICAL TRACKING SYSTEM, SUBMUCOUS RESECTION OF THE INFERIOR TURBINATES  Surgery Location: [facility]   Surgeon: Javier Zayas MD  Surgery Date: 8/1/23  Time of Surgery:   Where patient plans to recover: At home with family  Fax number for surgical facility: Note does not need to be faxed, will be available electronically in Epic.    Assessment & Plan     The proposed surgical procedure is considered INTERMEDIATE risk.    Pre-op exam    - CBC with platelets; Future  - Basic metabolic panel  (Ca, Cl, CO2, Creat, Gluc, K, Na, BUN); Future  - HCG Qual, Urine ([id number]); Future  - CBC with platelets  - Basic metabolic panel  (Ca, Cl, CO2, Creat, Gluc, K, Na, BUN)  - HCG Qual, Urine ([id number])    Chronic maxillary sinusitis              - No identified additional risk factors other than previously addressed    Antiplatelet or Anticoagulation Medication Instructions:   - Patient is on no antiplatelet or anticoagulation medications.    Additional Medication Instructions:   - ibuprofen (Advil, Motrin): HOLD 1 day before surgery.     RECOMMENDATION:  APPROVAL GIVEN to proceed with proposed procedure, without further diagnostic evaluation.    Subjective       HPI related to upcoming procedure: SINUS SURGERY, ENDOSCOPIC, USING OPTICAL TRACKING SYSTEM, SUBMUCOUS RESECTION OF THE INFERIOR TURBINATES        7/17/2023     3:15 PM   Preop Questions   1. Have you ever had a heart attack or stroke? No   2. Have  you ever had surgery on your heart or blood vessels, such as a stent placement, a coronary artery bypass, or surgery on an artery in your head, neck, heart, or legs? No   3. Do you have chest pain with activity? No   4. Do you have a history of  heart failure? No   5. Do you currently have a cold, bronchitis or symptoms of other infection? No   6. Do you have a cough, shortness of breath, or wheezing? No   7. Do you or anyone in your family have previous history of blood clots? No   8. Do you or does anyone in your family have a serious bleeding problem such as prolonged bleeding following surgeries or cuts? No   9. Have you ever had problems with anemia or been told to take iron pills? No   10. Have you had any abnormal blood loss such as black, tarry or bloody stools, or abnormal vaginal bleeding? No   11. Have you ever had a blood transfusion? No   12. Are you willing to have a blood transfusion if it is medically needed before, during, or after your surgery? Yes   13. Have you or any of your relatives ever had problems with anesthesia? No   14. Do you have sleep apnea, excessive snoring or daytime drowsiness? No   15. Do you have any artifical heart valves or other implanted medical devices like a pacemaker, defibrillator, or continuous glucose monitor? No   16. Do you have artificial joints? No   17. Are you allergic to latex? No   18. Is there any chance that you may be pregnant? No       Health Care Directive:  Patient does not have a Health Care Directive or Living Will:     Preoperative Review of :   reviewed - controlled substances reflected in medication list.      Status of Chronic Conditions:  See problem list for active medical problems.  Problems all longstanding and stable, except as noted/documented.  See ROS for pertinent symptoms related to these conditions.    Review of Systems  Constitutional, neuro, ENT, endocrine, pulmonary, cardiac, gastrointestinal, genitourinary, musculoskeletal,  "integument and psychiatric systems are negative, except as otherwise noted.    Patient Active Problem List    Diagnosis Date Noted    Chronic maxillary sinusitis 07/05/2023     Priority: Medium    Chronic ethmoidal sinusitis 07/05/2023     Priority: Medium    Chronic frontal sinusitis 07/05/2023     Priority: Medium    Facial pressure 07/05/2023     Priority: Medium    Hypertrophy of both inferior nasal turbinates 07/05/2023     Priority: Medium    Vitiligo 10/25/2016     Priority: Medium    Pre-syncope 10/25/2016     Priority: Medium    Intrinsic eczema 10/25/2016     Priority: Medium      No past medical history on file.  No past surgical history on file.  Current Outpatient Medications   Medication Sig Dispense Refill    budesonide (PULMICORT) 0.5 MG/2ML neb solution Place 0.5 mg/2 mL budesonide vial in 8 oz normal saline sinus rinse bottle.  Irrigate each nostril with one half of the bottle twice daily. 360 mL 3    cetirizine (ZYRTEC) 10 MG tablet Take 10 mg by mouth daily      crisaborole (EUCRISA) 2 % ointment Apply topically 2 times daily 60 g 3    loratadine (CLARITIN) 10 MG tablet Take 10 mg by mouth daily      Pseudoephedrine HCl (SUDAFED PO) Take by mouth daily         No Known Allergies     Social History     Tobacco Use    Smoking status: Never    Smokeless tobacco: Never   Substance Use Topics    Alcohol use: No     Family History   Problem Relation Age of Onset    Allergies Mother     Allergies Sister     Thyroid Disease Sister     Allergies Brother     Allergies Brother     Allergies Maternal Grandmother     Asthma Maternal Grandmother     Allergies Maternal Grandfather     Asthma Maternal Grandfather     Melanoma No family hx of      History   Drug Use No         Objective     /60 (BP Location: Right arm, Cuff Size: Adult Regular)   Pulse 77   Temp 98  F (36.7  C) (Tympanic)   Resp 16   Ht 1.651 m (5' 5\")   Wt 56.7 kg (125 lb)   SpO2 99%   BMI 20.80 kg/m      Physical Exam    GENERAL " APPEARANCE: healthy, alert and no distress     EYES: EOMI,  PERRL     HENT: ear canals and TM's normal and nose and mouth without ulcers or lesions     NECK: no adenopathy, no asymmetry, masses, or scars and thyroid normal to palpation     RESP: lungs clear to auscultation - no rales, rhonchi or wheezes     CV: regular rates and rhythm, normal S1 S2, no S3 or S4 and no murmur, click or rub     ABDOMEN:  soft, nontender, no HSM or masses and bowel sounds normal     MS: extremities normal- no gross deformities noted, no evidence of inflammation in joints, FROM in all extremities.     SKIN: no suspicious lesions or rashes     NEURO: Normal strength and tone, sensory exam grossly normal, mentation intact and speech normal     PSYCH: mentation appears normal. and affect normal/bright     LYMPHATICS: No cervical adenopathy    No results for input(s): HGB, PLT, INR, NA, POTASSIUM, CR, A1C in the last 70523 hours.     Diagnostics:  Recent Results (from the past 24 hour(s))   CBC with platelets    Collection Time: 07/17/23  3:46 PM   Result Value Ref Range    WBC Count 9.7 4.0 - 11.0 10e3/uL    RBC Count 4.61 3.80 - 5.20 10e6/uL    Hemoglobin 14.7 11.7 - 15.7 g/dL    Hematocrit 42.1 35.0 - 47.0 %    MCV 91 78 - 100 fL    MCH 31.9 26.5 - 33.0 pg    MCHC 34.9 31.5 - 36.5 g/dL    RDW 12.0 10.0 - 15.0 %    Platelet Count 297 150 - 450 10e3/uL   Basic metabolic panel  (Ca, Cl, CO2, Creat, Gluc, K, Na, BUN)    Collection Time: 07/17/23  3:46 PM   Result Value Ref Range    Sodium 139 136 - 145 mmol/L    Potassium 4.0 3.4 - 5.3 mmol/L    Chloride 102 98 - 107 mmol/L    Carbon Dioxide (CO2) 27 22 - 29 mmol/L    Anion Gap 10 7 - 15 mmol/L    Urea Nitrogen 10.5 6.0 - 20.0 mg/dL    Creatinine 0.68 0.51 - 0.95 mg/dL    Calcium 9.6 8.6 - 10.0 mg/dL    Glucose 115 (H) 70 - 99 mg/dL    GFR Estimate >90 >60 mL/min/1.73m2   HCG Qual, Urine ([id number])    Collection Time: 07/17/23  3:46 PM   Result Value Ref Range    hCG Urine Qualitative  Negative Negative      No EKG required, no history of coronary heart disease, significant arrhythmia, peripheral arterial disease or other structural heart disease.    Revised Cardiac Risk Index (RCRI):  The patient has the following serious cardiovascular risks for perioperative complications:   - No serious cardiac risks = 0 points     RCRI Interpretation: 0 points: Class I (very low risk - 0.4% complication rate)           Signed Electronically by: СЕРГЕЙ Andrew CNP  Copy of this evaluation report is provided to requesting physician.

## 2023-07-31 ENCOUNTER — ANESTHESIA EVENT (OUTPATIENT)
Dept: SURGERY | Facility: AMBULATORY SURGERY CENTER | Age: 23
End: 2023-07-31
Payer: COMMERCIAL

## 2023-07-31 NOTE — ANESTHESIA PREPROCEDURE EVALUATION
Anesthesia Pre-Procedure Evaluation    Patient: Sirisha Mauricio   MRN: [id number] : 2000        Procedure : Procedure(s):  SINUS SURGERY, ENDOSCOPIC, USING OPTICAL TRACKING SYSTEM  SUBMUCOUS RESECTION OF THE INFERIOR TURBINATES          No past medical history on file.   No past surgical history on file.   No Known Allergies   Social History     Tobacco Use    Smoking status: Never    Smokeless tobacco: Never   Substance Use Topics    Alcohol use: No      Wt Readings from Last 1 Encounters:   23 56.7 kg (125 lb)           Physical Exam    Airway        Mallampati: II   TM distance: > 3 FB   Neck ROM: full   Mouth opening: > 3 cm    Respiratory Devices and Support         Dental       (+) Minor Abnormalities - some fillings, tiny chips      Cardiovascular   cardiovascular exam normal          Pulmonary   pulmonary exam normal                OUTSIDE LABS:  CBC:   Lab Results   Component Value Date    WBC 9.7 2023    WBC 7.6 10/11/2016    HGB 14.7 2023    HGB 13.7 10/11/2016    HCT 42.1 2023    HCT 41.4 10/11/2016     2023     10/11/2016     BMP:   Lab Results   Component Value Date     2023     10/11/2016    POTASSIUM 4.0 2023    POTASSIUM 3.8 10/11/2016    CHLORIDE 102 2023    CHLORIDE 104 10/11/2016    CO2 27 2023    CO2 29 10/11/2016    BUN 10.5 2023    BUN 13 10/11/2016    CR 0.68 2023    CR 0.65 10/11/2016     (H) 2023    GLC 94 10/11/2016     COAGS: No results found for: PTT, INR, FIBR  POC:   Lab Results   Component Value Date    HCG Negative 2023     HEPATIC:   Lab Results   Component Value Date    ALBUMIN 4.0 10/11/2016    PROTTOTAL 7.6 10/11/2016    ALT 21 10/11/2016    AST 26 10/11/2016    ALKPHOS 150 10/11/2016    BILITOTAL 0.2 10/11/2016     OTHER:   Lab Results   Component Value Date    KEM 9.6 2023    TSH 1.86 10/11/2016       Anesthesia Plan    ASA Status:  1    NPO Status:  NPO  Appropriate    Anesthesia Type: General.     - Airway: ETT   Induction: Intravenous, Propofol.   Maintenance: Balanced.        Consents    Anesthesia Plan(s) and associated risks, benefits, and realistic alternatives discussed. Questions answered and patient/representative(s) expressed understanding.     - Discussed:     - Discussed with:  Patient, Parent (Mother and/or Father)      - Extended Intubation/Ventilatory Support Discussed: No.      - Patient is DNR/DNI Status: No     Use of blood products discussed: No .     Postoperative Care    Pain management: IV analgesics, Oral pain medications, Multi-modal analgesia.   PONV prophylaxis: Ondansetron (or other 5HT-3), Dexamethasone or Solumedrol, Background Propofol Infusion     Comments:                Kulwinder Burks MD

## 2023-08-01 ENCOUNTER — ANESTHESIA (OUTPATIENT)
Dept: SURGERY | Facility: AMBULATORY SURGERY CENTER | Age: 23
End: 2023-08-01
Payer: COMMERCIAL

## 2023-08-01 ENCOUNTER — HOSPITAL ENCOUNTER (OUTPATIENT)
Facility: AMBULATORY SURGERY CENTER | Age: 23
Discharge: HOME | End: 2023-08-01
Attending: OTOLARYNGOLOGY
Payer: COMMERCIAL

## 2023-08-01 VITALS
OXYGEN SATURATION: 100 % | HEIGHT: 65 IN | RESPIRATION RATE: 12 BRPM | WEIGHT: 125 LBS | DIASTOLIC BLOOD PRESSURE: 97 MMHG | SYSTOLIC BLOOD PRESSURE: 144 MMHG | TEMPERATURE: 98.1 F | HEART RATE: 61 BPM | BODY MASS INDEX: 20.83 KG/M2

## 2023-08-01 DIAGNOSIS — J32.0 CHRONIC MAXILLARY SINUSITIS: ICD-10-CM

## 2023-08-01 DIAGNOSIS — J32.2 CHRONIC ETHMOIDAL SINUSITIS: ICD-10-CM

## 2023-08-01 DIAGNOSIS — J32.1 CHRONIC FRONTAL SINUSITIS: ICD-10-CM

## 2023-08-01 DIAGNOSIS — R44.8 FACIAL PRESSURE: ICD-10-CM

## 2023-08-01 DIAGNOSIS — J34.3 HYPERTROPHY OF BOTH INFERIOR NASAL TURBINATES: Primary | ICD-10-CM

## 2023-08-01 PROCEDURE — 31255 NSL/SINS NDSC W/TOT ETHMDCT: CPT | Mod: 50

## 2023-08-01 PROCEDURE — 30140 RESECT INFERIOR TURBINATE: CPT | Mod: 50

## 2023-08-01 PROCEDURE — G8916 PT W IV AB GIVEN ON TIME: HCPCS

## 2023-08-01 PROCEDURE — 31256 EXPLORATION MAXILLARY SINUS: CPT | Mod: 50 | Performed by: OTOLARYNGOLOGY

## 2023-08-01 PROCEDURE — G8907 PT DOC NO EVENTS ON DISCHARG: HCPCS

## 2023-08-01 PROCEDURE — 31256 EXPLORATION MAXILLARY SINUS: CPT | Mod: 50

## 2023-08-01 PROCEDURE — 61782 SCAN PROC CRANIAL EXTRA: CPT | Performed by: OTOLARYNGOLOGY

## 2023-08-01 PROCEDURE — 61782 SCAN PROC CRANIAL EXTRA: CPT

## 2023-08-01 PROCEDURE — 30140 RESECT INFERIOR TURBINATE: CPT | Mod: 50 | Performed by: OTOLARYNGOLOGY

## 2023-08-01 PROCEDURE — 31255 NSL/SINS NDSC W/TOT ETHMDCT: CPT | Mod: 50 | Performed by: OTOLARYNGOLOGY

## 2023-08-01 RX ORDER — OXYMETAZOLINE HYDROCHLORIDE 0.05 G/100ML
SPRAY NASAL PRN
Status: DISCONTINUED | OUTPATIENT
Start: 2023-08-01 | End: 2023-08-01 | Stop reason: HOSPADM

## 2023-08-01 RX ORDER — LORAZEPAM 2 MG/ML
.5-1 INJECTION INTRAMUSCULAR
Status: DISCONTINUED | OUTPATIENT
Start: 2023-08-01 | End: 2023-08-02 | Stop reason: HOSPADM

## 2023-08-01 RX ORDER — DEXAMETHASONE SODIUM PHOSPHATE 4 MG/ML
INJECTION, SOLUTION INTRA-ARTICULAR; INTRALESIONAL; INTRAMUSCULAR; INTRAVENOUS; SOFT TISSUE PRN
Status: DISCONTINUED | OUTPATIENT
Start: 2023-08-01 | End: 2023-08-01

## 2023-08-01 RX ORDER — ALBUTEROL SULFATE 0.83 MG/ML
2.5 SOLUTION RESPIRATORY (INHALATION) EVERY 4 HOURS PRN
Status: DISCONTINUED | OUTPATIENT
Start: 2023-08-01 | End: 2023-08-02 | Stop reason: HOSPADM

## 2023-08-01 RX ORDER — PROPOFOL 10 MG/ML
INJECTION, EMULSION INTRAVENOUS CONTINUOUS PRN
Status: DISCONTINUED | OUTPATIENT
Start: 2023-08-01 | End: 2023-08-01

## 2023-08-01 RX ORDER — HYDROXYZINE HYDROCHLORIDE 25 MG/1
25 TABLET, FILM COATED ORAL EVERY 6 HOURS PRN
Status: DISCONTINUED | OUTPATIENT
Start: 2023-08-01 | End: 2023-08-02 | Stop reason: HOSPADM

## 2023-08-01 RX ORDER — SODIUM CHLORIDE, SODIUM LACTATE, POTASSIUM CHLORIDE, CALCIUM CHLORIDE 600; 310; 30; 20 MG/100ML; MG/100ML; MG/100ML; MG/100ML
INJECTION, SOLUTION INTRAVENOUS CONTINUOUS
Status: DISCONTINUED | OUTPATIENT
Start: 2023-08-01 | End: 2023-08-02 | Stop reason: HOSPADM

## 2023-08-01 RX ORDER — LIDOCAINE HYDROCHLORIDE 20 MG/ML
INJECTION, SOLUTION INFILTRATION; PERINEURAL PRN
Status: DISCONTINUED | OUTPATIENT
Start: 2023-08-01 | End: 2023-08-01

## 2023-08-01 RX ORDER — DEXAMETHASONE SODIUM PHOSPHATE 4 MG/ML
4 INJECTION, SOLUTION INTRA-ARTICULAR; INTRALESIONAL; INTRAMUSCULAR; INTRAVENOUS; SOFT TISSUE
Status: DISCONTINUED | OUTPATIENT
Start: 2023-08-01 | End: 2023-08-02 | Stop reason: HOSPADM

## 2023-08-01 RX ORDER — ACETAMINOPHEN 325 MG/1
975 TABLET ORAL ONCE
Status: DISCONTINUED | OUTPATIENT
Start: 2023-08-01 | End: 2023-08-02 | Stop reason: HOSPADM

## 2023-08-01 RX ORDER — CEFAZOLIN SODIUM 2 G/100ML
2 INJECTION, SOLUTION INTRAVENOUS SEE ADMIN INSTRUCTIONS
Status: DISCONTINUED | OUTPATIENT
Start: 2023-08-01 | End: 2023-08-02 | Stop reason: HOSPADM

## 2023-08-01 RX ORDER — OXYCODONE HYDROCHLORIDE 5 MG/1
5-10 TABLET ORAL EVERY 6 HOURS PRN
Status: DISCONTINUED | OUTPATIENT
Start: 2023-08-01 | End: 2023-08-02 | Stop reason: HOSPADM

## 2023-08-01 RX ORDER — ONDANSETRON 2 MG/ML
4 INJECTION INTRAMUSCULAR; INTRAVENOUS EVERY 30 MIN PRN
Status: DISCONTINUED | OUTPATIENT
Start: 2023-08-01 | End: 2023-08-02 | Stop reason: HOSPADM

## 2023-08-01 RX ORDER — MEPERIDINE HYDROCHLORIDE 25 MG/ML
12.5 INJECTION INTRAMUSCULAR; INTRAVENOUS; SUBCUTANEOUS EVERY 5 MIN PRN
Status: DISCONTINUED | OUTPATIENT
Start: 2023-08-01 | End: 2023-08-02 | Stop reason: HOSPADM

## 2023-08-01 RX ORDER — FENTANYL CITRATE 50 UG/ML
50 INJECTION, SOLUTION INTRAMUSCULAR; INTRAVENOUS EVERY 5 MIN PRN
Status: DISCONTINUED | OUTPATIENT
Start: 2023-08-01 | End: 2023-08-02 | Stop reason: HOSPADM

## 2023-08-01 RX ORDER — ACETAMINOPHEN 325 MG/1
975 TABLET ORAL ONCE
Status: COMPLETED | OUTPATIENT
Start: 2023-08-01 | End: 2023-08-01

## 2023-08-01 RX ORDER — FENTANYL CITRATE 50 UG/ML
25 INJECTION, SOLUTION INTRAMUSCULAR; INTRAVENOUS EVERY 5 MIN PRN
Status: DISCONTINUED | OUTPATIENT
Start: 2023-08-01 | End: 2023-08-02 | Stop reason: HOSPADM

## 2023-08-01 RX ORDER — IBUPROFEN 200 MG
600 TABLET ORAL EVERY 6 HOURS PRN
Qty: 200 TABLET | Refills: 1 | Status: SHIPPED | OUTPATIENT
Start: 2023-08-01 | End: 2023-08-11

## 2023-08-01 RX ORDER — ONDANSETRON 4 MG/1
4 TABLET, ORALLY DISINTEGRATING ORAL EVERY 6 HOURS PRN
Status: DISCONTINUED | OUTPATIENT
Start: 2023-08-01 | End: 2023-08-02 | Stop reason: HOSPADM

## 2023-08-01 RX ORDER — DIMENHYDRINATE 50 MG/ML
25 INJECTION, SOLUTION INTRAMUSCULAR; INTRAVENOUS
Status: DISCONTINUED | OUTPATIENT
Start: 2023-08-01 | End: 2023-08-02 | Stop reason: HOSPADM

## 2023-08-01 RX ORDER — ONDANSETRON 2 MG/ML
INJECTION INTRAMUSCULAR; INTRAVENOUS PRN
Status: DISCONTINUED | OUTPATIENT
Start: 2023-08-01 | End: 2023-08-01

## 2023-08-01 RX ORDER — GLYCOPYRROLATE 0.2 MG/ML
INJECTION, SOLUTION INTRAMUSCULAR; INTRAVENOUS PRN
Status: DISCONTINUED | OUTPATIENT
Start: 2023-08-01 | End: 2023-08-01

## 2023-08-01 RX ORDER — ACETAMINOPHEN 500 MG
1000 TABLET ORAL EVERY 6 HOURS PRN
Qty: 200 TABLET | Refills: 1 | Status: SHIPPED | OUTPATIENT
Start: 2023-08-01 | End: 2023-08-11

## 2023-08-01 RX ORDER — DEXMEDETOMIDINE HYDROCHLORIDE 4 UG/ML
INJECTION, SOLUTION INTRAVENOUS PRN
Status: DISCONTINUED | OUTPATIENT
Start: 2023-08-01 | End: 2023-08-01

## 2023-08-01 RX ORDER — FENTANYL CITRATE 50 UG/ML
INJECTION, SOLUTION INTRAMUSCULAR; INTRAVENOUS PRN
Status: DISCONTINUED | OUTPATIENT
Start: 2023-08-01 | End: 2023-08-01

## 2023-08-01 RX ORDER — OXYCODONE HYDROCHLORIDE 5 MG/1
5 TABLET ORAL
Status: COMPLETED | OUTPATIENT
Start: 2023-08-01 | End: 2023-08-01

## 2023-08-01 RX ORDER — LIDOCAINE 40 MG/G
CREAM TOPICAL
Status: DISCONTINUED | OUTPATIENT
Start: 2023-08-01 | End: 2023-08-02 | Stop reason: HOSPADM

## 2023-08-01 RX ORDER — HYDRALAZINE HYDROCHLORIDE 20 MG/ML
2.5-5 INJECTION INTRAMUSCULAR; INTRAVENOUS EVERY 10 MIN PRN
Status: DISCONTINUED | OUTPATIENT
Start: 2023-08-01 | End: 2023-08-02 | Stop reason: HOSPADM

## 2023-08-01 RX ORDER — CEFAZOLIN SODIUM 2 G/100ML
2 INJECTION, SOLUTION INTRAVENOUS
Status: DISCONTINUED | OUTPATIENT
Start: 2023-08-01 | End: 2023-08-02 | Stop reason: HOSPADM

## 2023-08-01 RX ORDER — SENNA AND DOCUSATE SODIUM 50; 8.6 MG/1; MG/1
1 TABLET, FILM COATED ORAL AT BEDTIME
Qty: 30 TABLET | Refills: 1 | Status: SHIPPED | OUTPATIENT
Start: 2023-08-01 | End: 2023-08-07

## 2023-08-01 RX ORDER — PROPOFOL 10 MG/ML
INJECTION, EMULSION INTRAVENOUS PRN
Status: DISCONTINUED | OUTPATIENT
Start: 2023-08-01 | End: 2023-08-01

## 2023-08-01 RX ORDER — ONDANSETRON 4 MG/1
4 TABLET, ORALLY DISINTEGRATING ORAL EVERY 30 MIN PRN
Status: DISCONTINUED | OUTPATIENT
Start: 2023-08-01 | End: 2023-08-02 | Stop reason: HOSPADM

## 2023-08-01 RX ORDER — IBUPROFEN 600 MG/1
600 TABLET, FILM COATED ORAL EVERY 6 HOURS PRN
Status: DISCONTINUED | OUTPATIENT
Start: 2023-08-01 | End: 2023-08-02 | Stop reason: HOSPADM

## 2023-08-01 RX ORDER — KETOROLAC TROMETHAMINE 30 MG/ML
15 INJECTION, SOLUTION INTRAMUSCULAR; INTRAVENOUS
Status: DISCONTINUED | OUTPATIENT
Start: 2023-08-01 | End: 2023-08-02 | Stop reason: HOSPADM

## 2023-08-01 RX ORDER — HALOPERIDOL 5 MG/ML
1 INJECTION INTRAMUSCULAR
Status: DISCONTINUED | OUTPATIENT
Start: 2023-08-01 | End: 2023-08-02 | Stop reason: HOSPADM

## 2023-08-01 RX ORDER — LABETALOL HYDROCHLORIDE 5 MG/ML
10 INJECTION, SOLUTION INTRAVENOUS
Status: DISCONTINUED | OUTPATIENT
Start: 2023-08-01 | End: 2023-08-02 | Stop reason: HOSPADM

## 2023-08-01 RX ORDER — FENTANYL CITRATE 50 UG/ML
25 INJECTION, SOLUTION INTRAMUSCULAR; INTRAVENOUS
Status: DISCONTINUED | OUTPATIENT
Start: 2023-08-01 | End: 2023-08-02 | Stop reason: HOSPADM

## 2023-08-01 RX ORDER — OXYCODONE HYDROCHLORIDE 5 MG/1
10 TABLET ORAL
Status: DISCONTINUED | OUTPATIENT
Start: 2023-08-01 | End: 2023-08-02 | Stop reason: HOSPADM

## 2023-08-01 RX ORDER — OXYCODONE HYDROCHLORIDE 5 MG/1
5 TABLET ORAL EVERY 6 HOURS PRN
Qty: 10 TABLET | Refills: 0 | Status: SHIPPED | OUTPATIENT
Start: 2023-08-01 | End: 2023-08-07

## 2023-08-01 RX ADMIN — DEXMEDETOMIDINE HYDROCHLORIDE 10 MCG: 4 INJECTION, SOLUTION INTRAVENOUS at 11:03

## 2023-08-01 RX ADMIN — GLYCOPYRROLATE 0.2 MG: 0.2 INJECTION, SOLUTION INTRAMUSCULAR; INTRAVENOUS at 10:54

## 2023-08-01 RX ADMIN — ACETAMINOPHEN 975 MG: 325 TABLET ORAL at 10:21

## 2023-08-01 RX ADMIN — LIDOCAINE HYDROCHLORIDE 60 MG: 20 INJECTION, SOLUTION INFILTRATION; PERINEURAL at 10:48

## 2023-08-01 RX ADMIN — FENTANYL CITRATE 50 MCG: 50 INJECTION, SOLUTION INTRAMUSCULAR; INTRAVENOUS at 10:55

## 2023-08-01 RX ADMIN — SODIUM CHLORIDE, SODIUM LACTATE, POTASSIUM CHLORIDE, CALCIUM CHLORIDE: 600; 310; 30; 20 INJECTION, SOLUTION INTRAVENOUS at 10:32

## 2023-08-01 RX ADMIN — DEXAMETHASONE SODIUM PHOSPHATE 8 MG: 4 INJECTION, SOLUTION INTRA-ARTICULAR; INTRALESIONAL; INTRAMUSCULAR; INTRAVENOUS; SOFT TISSUE at 10:58

## 2023-08-01 RX ADMIN — FENTANYL CITRATE 50 MCG: 50 INJECTION, SOLUTION INTRAMUSCULAR; INTRAVENOUS at 10:47

## 2023-08-01 RX ADMIN — PROPOFOL 180 MG: 10 INJECTION, EMULSION INTRAVENOUS at 10:48

## 2023-08-01 RX ADMIN — PROPOFOL 200 MCG/KG/MIN: 10 INJECTION, EMULSION INTRAVENOUS at 11:39

## 2023-08-01 RX ADMIN — PROPOFOL 200 MCG/KG/MIN: 10 INJECTION, EMULSION INTRAVENOUS at 10:51

## 2023-08-01 RX ADMIN — CEFAZOLIN SODIUM 2 G: 2 INJECTION, SOLUTION INTRAVENOUS at 10:53

## 2023-08-01 RX ADMIN — ONDANSETRON 4 MG: 2 INJECTION INTRAMUSCULAR; INTRAVENOUS at 10:58

## 2023-08-01 RX ADMIN — Medication 30 MG: at 10:49

## 2023-08-01 RX ADMIN — LIDOCAINE HYDROCHLORIDE 40 MG: 20 INJECTION, SOLUTION INFILTRATION; PERINEURAL at 11:55

## 2023-08-01 RX ADMIN — OXYCODONE HYDROCHLORIDE 5 MG: 5 TABLET ORAL at 12:36

## 2023-08-01 NOTE — OP NOTE
PREOPERATIVE DIAGNOSES: Chronic sinusitis, inferior turbinate hypertrophy.     POSTOPERATIVE DIAGNOSES: Same.     PROCEDURE PERFORMED:   Bilateral endoscopic maxillary antrostomies   Bilateral endoscopic total ethmoidectomies  Bilateral submucous resection of the inferior turbinates   [employer] Fusion Image Guidance     SURGEON: Javier Zayas MD      ASSISTANTS: None.     BLOOD LOSS: 30 mL.     COMPLICATIONS: None.      SPECIMENS: None.     ANESTHESIA: General.     GRAFTS, IMPLANTS, DRAINS: None.     INDICATIONS: Improve symptoms.      FINDINGS:   1.  Chronic inflammatory changes to the maxillary and ethmoid sinuses.  2.  Bilateral inferior turbinate hypertrophy      OPERATIVE TECHNIQUE: The patient was brought to the operating room and identified by name clinic number.  They were placed supinely on the operating room table and general endotracheal anesthesia was induced by the anesthesia service.  The patient was prepped and draped in standard fashion.  [employer] Fusion Image Guidance was placed and registered.  Image guidance was used due to pathology involving the frontal/posterior ethmoid/sphenoid sinuses and disease abutting the skull base/orbit.     Afrin-soaked pledgets were placed in the nasal cavities bilaterally.  After standard surgical pause, the 0 degree endoscope was used to visualize the right nasal cavity.  Using the maxillary sinus seeker, the uncinate was brought forward.  The uncinate was then taken up to its origin.  Using through cutting instrumentation, 12 degree 4 mm shaver, and the 30 degree endoscope, the maxillary sinus opening was taken to the posterior limit of the maxillary sinus.  A wide maxillary antrostomy was fashioned respecting the orbit and inferior turbinate.  Next, the ethmoid bulla was entered inferomedially.  The ethmoid cell partitions were divided using through-cutting instrumentation.  Dissection was taken to the sphenoid face. The total ethmoidectomy was completed in a  posterior to anterior fashion.  Care was taken to respect the orbit and skull base.      Next, attention was turned to the left. Using the maxillary sinus seeker, the uncinate was brought forward.  The uncinate was then taken up to its origin.  Using through cutting instrumentation, 12 degree 4 mm shaver, and the 30 degree endoscope, the maxillary sinus opening was taken to the posterior limit of the maxillary sinus. A wide maxillary antrostomy was fashioned respecting the orbit and inferior turbinate.  Next, the ethmoid bulla was entered inferomedially.  The ethmoid cell partitions were divided using through-cutting instrumentation.  Dissection was taken to the sphenoid face. The total ethmoidectomy was completed in a posterior to anterior fashion.  Care was taken to respect the orbit and skull base.      Next, attention was turned to the inferior turbinates.  Bilateral nasal cavities were examined using the 0 degree 4 mm endoscope.  There was obvious bilateral inferior turbinate hypertrophy.  The turbinates were injected with 1% lidocaine with 1:100,000 epinephrine.  Attention was turned the right.  A 5 mm stab incision was made in the head of the inferior turbinate.  Using the 2.9 mm turbinate blade, submucous resection of the inferior turbinate was performed by dissecting of the bone and removing the submucosa with a shaver.  The inferior turbinate was then outfractured with a Max and the [place] elevator.  Attention was turned to the left.  A 5 mm stab incision was made in the head of the inferior turbinate.  Using the 2.9 mm turbinate blade, submucous resection of the inferior turbinate was performed by dissecting of the bone and removing the submucosa with a shaver. The inferior turbinate was then outfractured with a Max and the [place] elevator.  The stab incisions were left to heal by secondary intention.    The bilateral nasal cavities were irrigated and suctioned.  Hemostasis was assured.  PosiSep X was  placed in the middle meatal areas bilaterally to medialize the middle turbinates and assist with hemostasis bilaterally.       This marked the end of the procedure.  The patient was then turned over to anesthesia for recovery where they were awakened, extubated, and transferred to the PACU in excellent condition.  There were no complications.  There was minimal blood loss.  All standard operating protocol universal precautions were used throughout the procedure.     Javier Zayas MD  Department of Otolaryngology-Head and Neck Surgery  [facility]

## 2023-08-01 NOTE — TREATMENT PLAN
Patient declined urine HCG pre-op, states no chance of pregnancy. Notified Dr. Burks ([facility]).

## 2023-08-01 NOTE — ANESTHESIA POSTPROCEDURE EVALUATION
Patient: Sirisha Mauricio    Procedure: Procedure(s):  SINUS SURGERY, ENDOSCOPIC, USING OPTICAL TRACKING SYSTEM  SUBMUCOUS RESECTION OF THE INFERIOR TURBINATES       Anesthesia Type:  General    Note:  Disposition: Outpatient   Postop Pain Control: Uneventful            Sign Out: Well controlled pain   PONV: No   Neuro/Psych: Uneventful            Sign Out: Acceptable/Baseline neuro status   Airway/Respiratory: Uneventful            Sign Out: Acceptable/Baseline resp. status   CV/Hemodynamics: Uneventful            Sign Out: Acceptable CV status; No obvious hypovolemia; No obvious fluid overload   Other NRE:    DID A NON-ROUTINE EVENT OCCUR?            Last vitals:  Vitals Value Taken Time   /86 08/01/23 1215   Temp 98.1  F (36.7  C) 08/01/23 1210   Pulse 84 08/01/23 1221   Resp 12 08/01/23 1222   SpO2 100 % 08/01/23 1222   Vitals shown include unvalidated device data.    Electronically Signed By: Kulwinder Burks MD  August 1, 2023  12:23 PM

## 2023-08-01 NOTE — ANESTHESIA CARE TRANSFER NOTE
Patient: Sirisha Mauricio    Procedure: Procedure(s):  SINUS SURGERY, ENDOSCOPIC, USING OPTICAL TRACKING SYSTEM  SUBMUCOUS RESECTION OF THE INFERIOR TURBINATES       Diagnosis: Chronic maxillary sinusitis [J32.0]  Chronic ethmoidal sinusitis [J32.2]  Chronic frontal sinusitis [J32.1]  Facial pressure [R44.8]  Hypertrophy of both inferior nasal turbinates [J34.3]  Diagnosis Additional Information: No value filed.    Anesthesia Type:   General     Note:    Oropharynx: spontaneously breathing  Level of Consciousness: awake  Oxygen Supplementation: room air    Independent Airway: airway patency satisfactory and stable  Dentition: dentition unchanged  Vital Signs Stable: post-procedure vital signs reviewed and stable    Patient transferred to: PACU    Handoff Report: Identifed the Patient, Identified the Reponsible Provider, Reviewed the pertinent medical history, Discussed the surgical course, Reviewed Intra-OP anesthesia mangement and issues during anesthesia, Set expectations for post-procedure period and Allowed opportunity for questions and acknowledgement of understanding      Vitals:  Vitals Value Taken Time   BP     Temp     Pulse     Resp     SpO2         Electronically Signed By: СЕРГЕЙ Arvizu CRNA  August 1, 2023  12:11 PM

## 2023-08-01 NOTE — DISCHARGE INSTRUCTIONS
Postoperative Instructions Following Nose and Sinus Surgery    Recovery - Everyone recovers differently from a general anesthetic.  Symptoms such as fatigue, nausea, light-headedness, and sometimes a low grade fever (up to 101 degrees) are not unusual.  As your body removes the anesthetic drugs from circulation, these symptoms will resolve.  Your nose will be sore after surgery, and you may even have symptoms similar to a sinus infection with headache, congestion, and pressure.  These symptoms are normal and will resolve with healing.  For a few days you may experience bloody drainage from the nose, please use the drip pad as necessary for this.  If you are soaking a drip pad more frequently than once every 20 minutes, please call the office during business hours or the on call ENT physician after hours.  There are no diet restrictions after sinus surgery, and you can resume your home medications.  Please do not blow your nose for two weeks after surgery. You may wipe your nose gently. Limit your activity to no strenuous activities or exercise until I see you for the first follow-up visit.      Medications - You were sent home with narcotic pain medication.  If you can tolerate the discomfort during your recovery by using just plain Tylenol, please do so.  However, do not hesitate to use the stronger pain medication if needed.  If you were sent home with an antibiotic, it is primarily used to help the healing process.  If it causes loose bowel movements or other signs of intolerance, it is appropriate to discontinue it.      Nasal Irrigations: By far the most important measure you can take to speed recovery, and maximize the chances of long term success of sinus surgery is using the sinus rinses at least two times per day for the first month after surgery.     Complications - Problems related to sinus surgery almost always are detected during the operation, and special instructions will be given in that situation.   However, unexpected things can happen, and are all related to the structures around the sinus cavities.  Symptoms that should alert you to a possible problem include: severe eye pain or eye swelling, persistent heavy bleeding from the nose, and high fevers with headache and neck pain.  Any of these symptoms should be called into my office or to the on call ENT if after hours.  The most common non-emergency complication of sinus surgery is the formation of scar tissue which can re-block the sinuses.  This is addressed below.      Follow up - I will then see you around 10-14 days after surgery to clean out your nose.  This is done to aggressively manage the most common complication of this procedure, which is scar tissue blocking the sinuses.  The visit will require the examination of your nose and possibly removal of crusts of dry mucous and blood, with possible removal of early scar tissue.  Please prepare for these visits by using your sinus rinses.    If there are any questions or issues with the above, or if there are other issues that concern you, always feel free to call the clinic and I am happy to speak with you as soon as feasible.    Javier Zayas MD  Department of Otolaryngology-Head and Neck Surgery  [facility]  [phone] or [phone] After hours, [facility] option      [facility]  Adult Discharge Orders & Instructions for 24 hours after surgery  Get plenty of rest.  A responsible adult must stay with you for at least 24 hours after you leave the hospital.   Do not drive or use heavy equipment.  If you have weakness or tingling, don't drive or use heavy equipment until this feeling goes away.  Do not drink alcohol.  Avoid strenuous or risky activities.  Ask for help when climbing stairs.   You may feel lightheaded.  IF so, sit for a few minutes before standing.  Have someone help you get up.   If you have nausea (feel sick to your stomach): Drink  only clear liquids such as apple juice, ginger ale, broth or 7-Up.  Rest may also help.  Be sure to drink enough fluids.  Move to a regular diet as you feel able.  You may have a slight fever. Call the doctor if your fever is over 100 F (37.7 C) (taken under the tongue) or lasts longer than 24 hours.  You may have a dry mouth, a sore throat, muscle aches or trouble sleeping.  These should go away after 24 hours.  Do not make important or legal decisions.   Call your doctor for any of the followin.  Signs of infection (fever, growing tenderness at the surgery site, a large amount of drainage or bleeding, severe pain, foul-smelling drainage, redness, swelling).  2. It has been over 8 to 10 hours since surgery and you are still not able to urinate (pass water).  3.  Headache for over 24 hours.    While you were at the hospital today you received 975 mg Tylenol at 10:20 am. Maximum daily dosage is 4000 mg.

## 2023-08-06 NOTE — PROGRESS NOTES
Chief Complaint   Patient presents with    Post-op Visit     Sinus surgery on 8/1/2023- no concerns today     History of Present Illness  Sirisha Mauricio is a 23 year old female who presents today for follow-up.  The patient went to the operating room on 81/2023 for endoscopic sinus surgery and bilateral inferior turbinate reduction.  The patient has done well postoperatively with minimal pain.  The patient does report some congestion but no significant nasal bleeding.  They presents today for nasal examination and debridement.      Past Medical History  Patient Active Problem List   Diagnosis    Vitiligo    Pre-syncope    Intrinsic eczema    Chronic maxillary sinusitis    Chronic ethmoidal sinusitis    Chronic frontal sinusitis    Facial pressure    Hypertrophy of both inferior nasal turbinates     Current Medications    Current Outpatient Medications:     amoxicillin-clavulanate (AUGMENTIN) 875-125 MG tablet, Take 1 tablet by mouth 2 times daily, Disp: 20 tablet, Rfl: 0    budesonide (PULMICORT) 0.5 MG/2ML neb solution, Place 0.5 mg/2 mL budesonide vial in 8 oz normal saline sinus rinse bottle.  Irrigate each nostril with one half of the bottle twice daily., Disp: 360 mL, Rfl: 3    acetaminophen (TYLENOL) 500 MG tablet, Take 2 tablets (1,000 mg) by mouth every 6 hours as needed for pain or fever Every 6 hours as needed for post-op pain.  Alternate with ibuprofen., Disp: 200 tablet, Rfl: 1    crisaborole (EUCRISA) 2 % ointment, Apply topically 2 times daily (Patient not taking: Reported on 8/7/2023), Disp: 60 g, Rfl: 3    ibuprofen (ADVIL/MOTRIN) 200 MG tablet, Take 3 tablets (600 mg) by mouth every 6 hours as needed for moderate pain Every 6 hours as needed for post-op pain.  Alternate with acetaminophen., Disp: 200 tablet, Rfl: 1    Allergies  No Known Allergies    Social History  Social History     Socioeconomic History    Marital status: Single   Tobacco Use    Smoking status: Never    Smokeless tobacco: Never    Vaping Use    Vaping Use: Never used   Substance and Sexual Activity    Alcohol use: No    Drug use: No    Sexual activity: Never       Family History  Family History   Problem Relation Age of Onset    Allergies Mother     Allergies Sister     Thyroid Disease Sister     Allergies Brother     Allergies Brother     Allergies Maternal Grandmother     Asthma Maternal Grandmother     Allergies Maternal Grandfather     Asthma Maternal Grandfather     Melanoma No family hx of        Review of Systems  As per HPI and PMHx, otherwise 10 system review including the head and neck, constitutional, eyes, respiratory, GI, skin, neurologic, lymphatic, endocrine, and allergy systems is negative.    Physical Exam  /77 (BP Location: Left arm, Patient Position: Chair, Cuff Size: Adult Regular)   Pulse 80   Temp 98.2  F (36.8  C) (Tympanic)   Wt 56.7 kg (125 lb)   BMI 20.80 kg/m    GENERAL: Patient is a pleasant, cooperative 23 year old female in no acute distress.  HEAD: Normocephalic, atraumatic.  Hair and scalp are normal.  EYES: Pupils are equal, round, reactive to light and accommodation.  Extraocular movements are intact.  The sclera nonicteric without injection.  The extraocular structures are normal.  EARS: Normal shape and symmetry.  No tenderness when palpating the mastoid or tragal areas bilaterally.    NOSE: Nares are patent. Nasal mucosa is pink and moist.  The inferior turbinates are well lateralized and reduced. Negative anterior rhinoscopy.  NEUROLOGIC: Cranial nerves II through XII are grossly intact.  Voice is strong.  Patient is House-Brackman I/VI bilaterally.  CARDIOVASCULAR: Extremities are warm and well-perfused.  No significant peripheral edema.  RESPIRATORY: Patient has nonlabored breathing without cough, wheeze, stridor.  PSYCHIATRIC: Patient is alert and oriented.  Mood and affect appear normal.  SKIN: Warm and dry.  No scalp, face, or neck lesions noted.    Procedure: Nasal sinus debridement    Indication: Status post endoscopic sinus surgery    To improve medication delivery and assist with healing following sinus surgery, the nasal cavities were examined and debrided.  I proceeded with rigid nasal endoscopic examination and debridement.  The bilateral nasal cavities were anesthetized with 4% lidocaine and phenylephrine spray.  The bilateral nasal cavities were visualized.  The right nasal cavity was debrided with a rigid suctions.  The patient had a widely patent maxillary antrostomy and ethmoid cavity.  The sphenoethmoid recess was clear.  The frontal sinus outflow area was clear.  The middle turbinate was well medialized without adhesion.  Attention was turned to the left.  The left nasal cavity was debrided with a rigid suctions.  The patient had a widely patent maxillary antrostomy and ethmoid cavity.  The sphenoethmoid recess was clear.  The frontal sinus outflow area was clear.  The middle turbinate was well medialized without adhesion.  The scope was removed.  The patient tolerated the procedure well.    Assessment and Plan     ICD-10-CM    1. Chronic maxillary sinusitis  J32.0 NASAL/SINUS SCOPE WITH BIOPSY/POLYPECT/DEBRIDE,ENT      2. Chronic ethmoidal sinusitis  J32.2 NASAL/SINUS SCOPE WITH BIOPSY/POLYPECT/DEBRIDE,ENT      3. Facial pressure  R44.8 NASAL/SINUS SCOPE WITH BIOPSY/POLYPECT/DEBRIDE,ENT      4. Hypertrophy of both inferior nasal turbinates  J34.3 NASAL/SINUS SCOPE WITH BIOPSY/POLYPECT/DEBRIDE,ENT      5. Status post functional endoscopic sinus surgery  Z98.890 NASAL/SINUS SCOPE WITH BIOPSY/POLYPECT/DEBRIDE,ENT      6. Postoperative state  Z98.890 NASAL/SINUS SCOPE WITH BIOPSY/POLYPECT/DEBRIDE,ENT         It was my pleasure seeing Sirisha Mauricio today in clinic.  The patient is healing well after their endoscopic sinus surgery and turbinate reduction. She was successfully debrided today in office. We discussed an additional 1 week of nose blowing and activity restrictions.  She will  continue nasal saline irrigation with budesonide. I would like to see the patient back on 8/14/2023 for recheck.  The patient knows to contact me sooner with any problems or concerns.    Javier Zayas MD  Department of Otolaryngology-Head and Neck Surgery  [facility]

## 2023-08-07 ENCOUNTER — OFFICE VISIT (OUTPATIENT)
Dept: OTOLARYNGOLOGY | Facility: CLINIC | Age: 23
End: 2023-08-07
Payer: COMMERCIAL

## 2023-08-07 VITALS
SYSTOLIC BLOOD PRESSURE: 122 MMHG | BODY MASS INDEX: 20.8 KG/M2 | HEART RATE: 80 BPM | TEMPERATURE: 98.2 F | DIASTOLIC BLOOD PRESSURE: 77 MMHG | WEIGHT: 125 LBS

## 2023-08-07 DIAGNOSIS — J32.2 CHRONIC ETHMOIDAL SINUSITIS: ICD-10-CM

## 2023-08-07 DIAGNOSIS — Z98.890 POSTOPERATIVE STATE: ICD-10-CM

## 2023-08-07 DIAGNOSIS — Z98.890 STATUS POST FUNCTIONAL ENDOSCOPIC SINUS SURGERY: ICD-10-CM

## 2023-08-07 DIAGNOSIS — R44.8 FACIAL PRESSURE: ICD-10-CM

## 2023-08-07 DIAGNOSIS — J34.3 HYPERTROPHY OF BOTH INFERIOR NASAL TURBINATES: ICD-10-CM

## 2023-08-07 DIAGNOSIS — J32.0 CHRONIC MAXILLARY SINUSITIS: Primary | ICD-10-CM

## 2023-08-07 PROCEDURE — 99213 OFFICE O/P EST LOW 20 MIN: CPT | Mod: 25 | Performed by: OTOLARYNGOLOGY

## 2023-08-07 PROCEDURE — 31237 NSL/SINS NDSC SURG BX POLYPC: CPT | Mod: 50 | Performed by: OTOLARYNGOLOGY

## 2023-08-07 ASSESSMENT — PAIN SCALES - GENERAL: PAINLEVEL: NO PAIN (0)

## 2023-08-07 NOTE — LETTER
8/7/2023         RE: Sirisha Mauricio  [address] [place] [address]        Dear Colleague,    Thank you for referring your patient, Sirisha Mauricio, to the [facility]. Please see a copy of my visit note below.    Chief Complaint   Patient presents with     Post-op Visit     Sinus surgery on 8/1/2023- no concerns today     History of Present Illness  Sirisha Mauricio is a 23 year old female who presents today for follow-up.  The patient went to the operating room on 81/2023 for endoscopic sinus surgery and bilateral inferior turbinate reduction.  The patient has done well postoperatively with minimal pain.  The patient does report some congestion but no significant nasal bleeding.  They presents today for nasal examination and debridement.      Past Medical History  Patient Active Problem List   Diagnosis     Vitiligo     Pre-syncope     Intrinsic eczema     Chronic maxillary sinusitis     Chronic ethmoidal sinusitis     Chronic frontal sinusitis     Facial pressure     Hypertrophy of both inferior nasal turbinates     Current Medications    Current Outpatient Medications:      amoxicillin-clavulanate (AUGMENTIN) 875-125 MG tablet, Take 1 tablet by mouth 2 times daily, Disp: 20 tablet, Rfl: 0     budesonide (PULMICORT) 0.5 MG/2ML neb solution, Place 0.5 mg/2 mL budesonide vial in 8 oz normal saline sinus rinse bottle.  Irrigate each nostril with one half of the bottle twice daily., Disp: 360 mL, Rfl: 3     acetaminophen (TYLENOL) 500 MG tablet, Take 2 tablets (1,000 mg) by mouth every 6 hours as needed for pain or fever Every 6 hours as needed for post-op pain.  Alternate with ibuprofen., Disp: 200 tablet, Rfl: 1     crisaborole (EUCRISA) 2 % ointment, Apply topically 2 times daily (Patient not taking: Reported on 8/7/2023), Disp: 60 g, Rfl: 3     ibuprofen (ADVIL/MOTRIN) 200 MG tablet, Take 3 tablets (600 mg) by mouth every 6 hours as needed for moderate pain Every 6 hours as needed for  post-op pain.  Alternate with acetaminophen., Disp: 200 tablet, Rfl: 1    Allergies  No Known Allergies    Social History  Social History     Socioeconomic History     Marital status: Single   Tobacco Use     Smoking status: Never     Smokeless tobacco: Never   Vaping Use     Vaping Use: Never used   Substance and Sexual Activity     Alcohol use: No     Drug use: No     Sexual activity: Never       Family History  Family History   Problem Relation Age of Onset     Allergies Mother      Allergies Sister      Thyroid Disease Sister      Allergies Brother      Allergies Brother      Allergies Maternal Grandmother      Asthma Maternal Grandmother      Allergies Maternal Grandfather      Asthma Maternal Grandfather      Melanoma No family hx of        Review of Systems  As per HPI and PMHx, otherwise 10 system review including the head and neck, constitutional, eyes, respiratory, GI, skin, neurologic, lymphatic, endocrine, and allergy systems is negative.    Physical Exam  /77 (BP Location: Left arm, Patient Position: Chair, Cuff Size: Adult Regular)   Pulse 80   Temp 98.2  F (36.8  C) (Tympanic)   Wt 56.7 kg (125 lb)   BMI 20.80 kg/m    GENERAL: Patient is a pleasant, cooperative 23 year old female in no acute distress.  HEAD: Normocephalic, atraumatic.  Hair and scalp are normal.  EYES: Pupils are equal, round, reactive to light and accommodation.  Extraocular movements are intact.  The sclera nonicteric without injection.  The extraocular structures are normal.  EARS: Normal shape and symmetry.  No tenderness when palpating the mastoid or tragal areas bilaterally.    NOSE: Nares are patent. Nasal mucosa is pink and moist.  The inferior turbinates are well lateralized and reduced. Negative anterior rhinoscopy.  NEUROLOGIC: Cranial nerves II through XII are grossly intact.  Voice is strong.  Patient is House-Brackman I/VI bilaterally.  CARDIOVASCULAR: Extremities are warm and well-perfused.  No significant  peripheral edema.  RESPIRATORY: Patient has nonlabored breathing without cough, wheeze, stridor.  PSYCHIATRIC: Patient is alert and oriented.  Mood and affect appear normal.  SKIN: Warm and dry.  No scalp, face, or neck lesions noted.    Procedure: Nasal sinus debridement   Indication: Status post endoscopic sinus surgery    To improve medication delivery and assist with healing following sinus surgery, the nasal cavities were examined and debrided.  I proceeded with rigid nasal endoscopic examination and debridement.  The bilateral nasal cavities were anesthetized with 4% lidocaine and phenylephrine spray.  The bilateral nasal cavities were visualized.  The right nasal cavity was debrided with a rigid suctions.  The patient had a widely patent maxillary antrostomy and ethmoid cavity.  The sphenoethmoid recess was clear.  The frontal sinus outflow area was clear.  The middle turbinate was well medialized without adhesion.  Attention was turned to the left.  The left nasal cavity was debrided with a rigid suctions.  The patient had a widely patent maxillary antrostomy and ethmoid cavity.  The sphenoethmoid recess was clear.  The frontal sinus outflow area was clear.  The middle turbinate was well medialized without adhesion.  The scope was removed.  The patient tolerated the procedure well.    Assessment and Plan     ICD-10-CM    1. Chronic maxillary sinusitis  J32.0 NASAL/SINUS SCOPE WITH BIOPSY/POLYPECT/DEBRIDE,ENT      2. Chronic ethmoidal sinusitis  J32.2 NASAL/SINUS SCOPE WITH BIOPSY/POLYPECT/DEBRIDE,ENT      3. Facial pressure  R44.8 NASAL/SINUS SCOPE WITH BIOPSY/POLYPECT/DEBRIDE,ENT      4. Hypertrophy of both inferior nasal turbinates  J34.3 NASAL/SINUS SCOPE WITH BIOPSY/POLYPECT/DEBRIDE,ENT      5. Status post functional endoscopic sinus surgery  Z98.890 NASAL/SINUS SCOPE WITH BIOPSY/POLYPECT/DEBRIDE,ENT      6. Postoperative state  Z98.890 NASAL/SINUS SCOPE WITH BIOPSY/POLYPECT/DEBRIDE,ENT         It was my  pleasure seeing Sirisha Mauricio today in clinic.  The patient is healing well after their endoscopic sinus surgery and turbinate reduction. She was successfully debrided today in office. We discussed an additional 1 week of nose blowing and activity restrictions.  She will continue nasal saline irrigation with budesonide. I would like to see the patient back on 8/14/2023 for recheck.  The patient knows to contact me sooner with any problems or concerns.    Javier Zayas MD  Department of Otolaryngology-Head and Neck Surgery  [facility]       Again, thank you for allowing me to participate in the care of your patient.        Sincerely,        Javier Zayas MD

## 2023-08-11 NOTE — CONFIDENTIAL NOTE
No chief complaint on file.    History of Present Illness  Sirisha Mauricio is a 23 year old female who presents today for follow-up.  The patient went to the operating room on 81/2023 for endoscopic sinus surgery and bilateral inferior turbinate reduction.  The patient was seen for first postoperative follow-up and debridement on 8/7/2023 and she was healing well.      Overall, she has done well postoperatively with minimal pain.  The patient does report some congestion but no significant nasal bleeding.  She presents today for nasal examination and debridement.       Past Medical History  Patient Active Problem List   Diagnosis    Vitiligo    Pre-syncope    Intrinsic eczema    Chronic maxillary sinusitis    Chronic ethmoidal sinusitis    Chronic frontal sinusitis    Facial pressure    Hypertrophy of both inferior nasal turbinates     Current Medications    Current Outpatient Medications:     acetaminophen (TYLENOL) 500 MG tablet, Take 2 tablets (1,000 mg) by mouth every 6 hours as needed for pain or fever Every 6 hours as needed for post-op pain.  Alternate with ibuprofen., Disp: 200 tablet, Rfl: 1    amoxicillin-clavulanate (AUGMENTIN) 875-125 MG tablet, Take 1 tablet by mouth 2 times daily, Disp: 20 tablet, Rfl: 0    budesonide (PULMICORT) 0.5 MG/2ML neb solution, Place 0.5 mg/2 mL budesonide vial in 8 oz normal saline sinus rinse bottle.  Irrigate each nostril with one half of the bottle twice daily., Disp: 360 mL, Rfl: 3    crisaborole (EUCRISA) 2 % ointment, Apply topically 2 times daily (Patient not taking: Reported on 8/7/2023), Disp: 60 g, Rfl: 3    ibuprofen (ADVIL/MOTRIN) 200 MG tablet, Take 3 tablets (600 mg) by mouth every 6 hours as needed for moderate pain Every 6 hours as needed for post-op pain.  Alternate with acetaminophen., Disp: 200 tablet, Rfl: 1    Allergies  No Known Allergies    Social History  Social History     Socioeconomic History    Marital status: Single   Tobacco Use    Smoking status:  Never    Smokeless tobacco: Never   Vaping Use    Vaping Use: Never used   Substance and Sexual Activity    Alcohol use: No    Drug use: No    Sexual activity: Never       Family History  Family History   Problem Relation Age of Onset    Allergies Mother     Allergies Sister     Thyroid Disease Sister     Allergies Brother     Allergies Brother     Allergies Maternal Grandmother     Asthma Maternal Grandmother     Allergies Maternal Grandfather     Asthma Maternal Grandfather     Melanoma No family hx of        Review of Systems  As per HPI and PMHx, otherwise 10 system review including the head and neck, constitutional, eyes, respiratory, GI, skin, neurologic, lymphatic, endocrine, and allergy systems is negative.    Physical Exam  There were no vitals taken for this visit.  GENERAL: Patient is a pleasant, cooperative 23 year old female in no acute distress.  HEAD: Normocephalic, atraumatic.  Hair and scalp are normal.  EYES: Pupils are equal, round, reactive to light and accommodation.  Extraocular movements are intact.  The sclera nonicteric without injection.  The extraocular structures are normal.  EARS: Normal shape and symmetry.  No tenderness when palpating the mastoid or tragal areas bilaterally.    NOSE: Ley splints are in good placement.  The stitch and Ley splints are removed.  The nasal septum is midline without any evidence of septal hematoma.  The inferior turbinates are well lateralized.  The bilateral nasal cavities were suctioned free.  The patient noticed immediate improvement in the breathing.  The patient tolerated the procedure well.  NEUROLOGIC: Cranial nerves II through XII are grossly intact.  Voice is strong.  Patient is House-Brackman I/VI bilaterally.  CARDIOVASCULAR: Extremities are warm and well-perfused.  No significant peripheral edema.  RESPIRATORY: Patient has nonlabored breathing without cough, wheeze, stridor.  PSYCHIATRIC: Patient is alert and oriented.  Mood and affect  appear normal.  SKIN: Warm and dry.  No scalp, face, or neck lesions noted.    Procedure: Nasal sinus debridement   Indication: Status post endoscopic sinus surgery    To improve medication delivery and assist with healing following sinus surgery, the nasal cavities were examined and debrided.  I proceeded with rigid nasal endoscopic examination and debridement.  The ***bilateral nasal cavities were anesthetized with 4% lidocaine and phenylephrine spray.  The ***bilateral nasal cavities were visualized with a 30 degree rigid endoscope.  The right nasal cavity was debrided with a rigid suction, bayonet forceps, and Blakesley forceps.  The patient had a widely patent maxillary antrostomy and ethmoid cavity.  The sphenoethmoid recess was clear.  The frontal sinus outflow area was clear.  The middle turbinate was well medialized without adhesion.  Attention was turned to the left.  The left nasal cavity was debrided with a rigid suction, bayonet forceps, and Blakesley forceps. The patient had a widely patent maxillary antrostomy and ethmoid cavity.  The sphenoethmoid recess was clear.  The frontal sinus outflow area was clear.  The middle turbinate was well medialized without adhesion.  The scope was removed.  The patient tolerated the procedure well.     Assessment and Plan     ICD-10-CM    1. Chronic maxillary sinusitis  J32.0       2. Chronic ethmoidal sinusitis  J32.2       3. Facial pressure  R44.8       4. Hypertrophy of both inferior nasal turbinates  J34.3       5. Status post functional endoscopic sinus surgery  Z98.890       6. Postoperative state  Z98.890          It was my pleasure seeing Sirisha Mauricio today in clinic.  The patient is healing well after their endoscopic sinus surgery and turbinate reduction. She was successfully debrided today in office. We discussed stopping all nose blowing, lifting, activity restrictions.  She can continue the nasal irrigations with budesonide daily for the next month and  then as needed.  I would like to see the patient back in 3-4 months for recheck.  The patient knows to contact me sooner with any problems or concerns.     Javier Zayas MD  Department of Otolaryngology-Head and Neck Surgery  [facility]

## 2023-08-14 ENCOUNTER — OFFICE VISIT (OUTPATIENT)
Dept: OTOLARYNGOLOGY | Facility: CLINIC | Age: 23
End: 2023-08-14
Payer: COMMERCIAL

## 2023-08-14 VITALS
HEART RATE: 77 BPM | SYSTOLIC BLOOD PRESSURE: 125 MMHG | OXYGEN SATURATION: 99 % | TEMPERATURE: 98.5 F | DIASTOLIC BLOOD PRESSURE: 76 MMHG

## 2023-08-14 DIAGNOSIS — J32.2 CHRONIC ETHMOIDAL SINUSITIS: ICD-10-CM

## 2023-08-14 DIAGNOSIS — J34.3 HYPERTROPHY OF BOTH INFERIOR NASAL TURBINATES: ICD-10-CM

## 2023-08-14 DIAGNOSIS — Z98.890 STATUS POST FUNCTIONAL ENDOSCOPIC SINUS SURGERY: ICD-10-CM

## 2023-08-14 DIAGNOSIS — Z98.890 POSTOPERATIVE STATE: ICD-10-CM

## 2023-08-14 DIAGNOSIS — R44.8 FACIAL PRESSURE: ICD-10-CM

## 2023-08-14 DIAGNOSIS — J32.0 CHRONIC MAXILLARY SINUSITIS: Primary | ICD-10-CM

## 2023-08-14 PROCEDURE — 99213 OFFICE O/P EST LOW 20 MIN: CPT | Mod: 25 | Performed by: OTOLARYNGOLOGY

## 2023-08-14 PROCEDURE — 31237 NSL/SINS NDSC SURG BX POLYPC: CPT | Mod: 50 | Performed by: OTOLARYNGOLOGY

## 2023-08-14 RX ORDER — MOMETASONE FUROATE MONOHYDRATE 50 UG/1
2 SPRAY, METERED NASAL DAILY
Qty: 52 G | Refills: 3 | Status: SHIPPED | OUTPATIENT
Start: 2023-08-14

## 2023-08-14 ASSESSMENT — PAIN SCALES - GENERAL: PAINLEVEL: NO PAIN (0)

## 2023-08-14 NOTE — PROGRESS NOTES
Chief Complaint   Patient presents with    Surgical Followup     Doing great, no complications     History of Present Illness  Sirisha Mauricio is a 23 year old female who presents today for follow-up.  The patient went to the operating room on 81/2023 for endoscopic sinus surgery and bilateral inferior turbinate reduction.  The patient was seen for first postoperative follow-up and debridement on 8/7/2023 and she was healing well.       Overall, she has done well postoperatively with minimal pain.  The patient does report some congestion but no significant nasal bleeding.  She presents today for nasal examination and debridement.       Past Medical History  Patient Active Problem List   Diagnosis    Vitiligo    Pre-syncope    Intrinsic eczema    Chronic maxillary sinusitis    Chronic ethmoidal sinusitis    Chronic frontal sinusitis    Facial pressure    Hypertrophy of both inferior nasal turbinates     Current Medications    Current Outpatient Medications:     amoxicillin-clavulanate (AUGMENTIN) 875-125 MG tablet, Take 1 tablet by mouth 2 times daily, Disp: 20 tablet, Rfl: 0    budesonide (PULMICORT) 0.5 MG/2ML neb solution, Place 0.5 mg/2 mL budesonide vial in 8 oz normal saline sinus rinse bottle.  Irrigate each nostril with one half of the bottle twice daily., Disp: 360 mL, Rfl: 3    mometasone (NASONEX) 50 MCG/ACT nasal spray, Spray 2 sprays into both nostrils daily, Disp: 52 g, Rfl: 3    crisaborole (EUCRISA) 2 % ointment, Apply topically 2 times daily (Patient not taking: Reported on 8/7/2023), Disp: 60 g, Rfl: 3    Allergies  No Known Allergies    Social History  Social History     Socioeconomic History    Marital status: Single   Tobacco Use    Smoking status: Never    Smokeless tobacco: Never   Vaping Use    Vaping Use: Never used   Substance and Sexual Activity    Alcohol use: No    Drug use: No    Sexual activity: Never       Family History  Family History   Problem Relation Age of Onset    Allergies Mother      Allergies Sister     Thyroid Disease Sister     Allergies Brother     Allergies Brother     Allergies Maternal Grandmother     Asthma Maternal Grandmother     Allergies Maternal Grandfather     Asthma Maternal Grandfather     Melanoma No family hx of        Review of Systems  As per HPI and PMHx, otherwise 10 system review including the head and neck, constitutional, eyes, respiratory, GI, skin, neurologic, lymphatic, endocrine, and allergy systems is negative.    Physical Exam  /76   Pulse 77   Temp 98.5  F (36.9  C) (Tympanic)   SpO2 99%   GENERAL: Patient is a pleasant, cooperative 23 year old female in no acute distress.  HEAD: Normocephalic, atraumatic.  Hair and scalp are normal.  EYES: Pupils are equal, round, reactive to light and accommodation.  Extraocular movements are intact.  The sclera nonicteric without injection.  The extraocular structures are normal.  EARS: Normal shape and symmetry.  No tenderness when palpating the mastoid or tragal areas bilaterally.    NOSE: Ley splints are in good placement.  The stitch and Ley splints are removed.  The nasal septum is midline without any evidence of septal hematoma.  The inferior turbinates are well lateralized.  The bilateral nasal cavities were suctioned free.  The patient noticed immediate improvement in the breathing.  The patient tolerated the procedure well.  NEUROLOGIC: Cranial nerves II through XII are grossly intact.  Voice is strong.  Patient is House-Brackman I/VI bilaterally.  CARDIOVASCULAR: Extremities are warm and well-perfused.  No significant peripheral edema.  RESPIRATORY: Patient has nonlabored breathing without cough, wheeze, stridor.  PSYCHIATRIC: Patient is alert and oriented.  Mood and affect appear normal.  SKIN: Warm and dry.  No scalp, face, or neck lesions noted.     Procedure: Nasal sinus debridement   Indication: Status post endoscopic sinus surgery     To improve medication delivery and assist with healing  following sinus surgery, the nasal cavities were examined and debrided.  I proceeded with rigid nasal endoscopic examination and debridement.  The bilateral nasal cavities were anesthetized with 4% lidocaine and phenylephrine spray.  The bilateral nasal cavities were visualized with a 30 degree rigid endoscope.  The right nasal cavity was debrided with a rigid suction, bayonet forceps, and Blakesley forceps.  The patient had a widely patent maxillary antrostomy and ethmoid cavity.  The sphenoethmoid recess was clear.  The frontal sinus outflow area was clear.  The middle turbinate was well medialized without adhesion.  Attention was turned to the left.  The left nasal cavity was debrided with a rigid suction, bayonet forceps, and Blakesley forceps. The patient had a widely patent maxillary antrostomy and ethmoid cavity.  The sphenoethmoid recess was clear.  The frontal sinus outflow area was clear.  The middle turbinate was well medialized without adhesion.  The scope was removed.  The patient tolerated the procedure well.     Assessment and Plan     ICD-10-CM    1. Chronic maxillary sinusitis  J32.0 NASAL/SINUS SCOPE WITH BIOPSY/POLYPECT/DEBRIDE,ENT     mometasone (NASONEX) 50 MCG/ACT nasal spray      2. Chronic ethmoidal sinusitis  J32.2 NASAL/SINUS SCOPE WITH BIOPSY/POLYPECT/DEBRIDE,ENT     mometasone (NASONEX) 50 MCG/ACT nasal spray      3. Facial pressure  R44.8 NASAL/SINUS SCOPE WITH BIOPSY/POLYPECT/DEBRIDE,ENT     mometasone (NASONEX) 50 MCG/ACT nasal spray      4. Hypertrophy of both inferior nasal turbinates  J34.3 NASAL/SINUS SCOPE WITH BIOPSY/POLYPECT/DEBRIDE,ENT     mometasone (NASONEX) 50 MCG/ACT nasal spray      5. Status post functional endoscopic sinus surgery  Z98.890 NASAL/SINUS SCOPE WITH BIOPSY/POLYPECT/DEBRIDE,ENT     mometasone (NASONEX) 50 MCG/ACT nasal spray      6. Postoperative state  Z98.890 NASAL/SINUS SCOPE WITH BIOPSY/POLYPECT/DEBRIDE,ENT     mometasone (NASONEX) 50 MCG/ACT nasal spray          It was my pleasure seeing Sirisha Mauricio today in clinic.  The patient is healing well after their endoscopic sinus surgery and turbinate reduction. She was successfully debrided today in office. We discussed stopping all nose blowing, lifting, activity restrictions.  She can continue the nasal irrigations with budesonide daily for the next month and then as needed.  I would like to see the patient back in 3-4 months for recheck.  The patient knows to contact me sooner with any problems or concerns.      Javier Zayas MD  Department of Otolaryngology-Head and Neck Surgery  [facility]

## 2023-08-14 NOTE — LETTER
8/14/2023         RE: Sirisha Mauriico  [address] [place] [address]        Dear Colleague,    Thank you for referring your patient, Sirisha Mauricio, to the [facility]. Please see a copy of my visit note below.    Chief Complaint   Patient presents with     Surgical Followup     Doing great, no complications     History of Present Illness  Sirisha Mauricio is a 23 year old female who presents today for follow-up.  The patient went to the operating room on 81/2023 for endoscopic sinus surgery and bilateral inferior turbinate reduction.  The patient was seen for first postoperative follow-up and debridement on 8/7/2023 and she was healing well.       Overall, she has done well postoperatively with minimal pain.  The patient does report some congestion but no significant nasal bleeding.  She presents today for nasal examination and debridement.       Past Medical History  Patient Active Problem List   Diagnosis     Vitiligo     Pre-syncope     Intrinsic eczema     Chronic maxillary sinusitis     Chronic ethmoidal sinusitis     Chronic frontal sinusitis     Facial pressure     Hypertrophy of both inferior nasal turbinates     Current Medications    Current Outpatient Medications:      amoxicillin-clavulanate (AUGMENTIN) 875-125 MG tablet, Take 1 tablet by mouth 2 times daily, Disp: 20 tablet, Rfl: 0     budesonide (PULMICORT) 0.5 MG/2ML neb solution, Place 0.5 mg/2 mL budesonide vial in 8 oz normal saline sinus rinse bottle.  Irrigate each nostril with one half of the bottle twice daily., Disp: 360 mL, Rfl: 3     mometasone (NASONEX) 50 MCG/ACT nasal spray, Spray 2 sprays into both nostrils daily, Disp: 52 g, Rfl: 3     crisaborole (EUCRISA) 2 % ointment, Apply topically 2 times daily (Patient not taking: Reported on 8/7/2023), Disp: 60 g, Rfl: 3    Allergies  No Known Allergies    Social History  Social History     Socioeconomic History     Marital status: Single   Tobacco Use     Smoking  status: Never     Smokeless tobacco: Never   Vaping Use     Vaping Use: Never used   Substance and Sexual Activity     Alcohol use: No     Drug use: No     Sexual activity: Never       Family History  Family History   Problem Relation Age of Onset     Allergies Mother      Allergies Sister      Thyroid Disease Sister      Allergies Brother      Allergies Brother      Allergies Maternal Grandmother      Asthma Maternal Grandmother      Allergies Maternal Grandfather      Asthma Maternal Grandfather      Melanoma No family hx of        Review of Systems  As per HPI and PMHx, otherwise 10 system review including the head and neck, constitutional, eyes, respiratory, GI, skin, neurologic, lymphatic, endocrine, and allergy systems is negative.    Physical Exam  /76   Pulse 77   Temp 98.5  F (36.9  C) (Tympanic)   SpO2 99%   GENERAL: Patient is a pleasant, cooperative 23 year old female in no acute distress.  HEAD: Normocephalic, atraumatic.  Hair and scalp are normal.  EYES: Pupils are equal, round, reactive to light and accommodation.  Extraocular movements are intact.  The sclera nonicteric without injection.  The extraocular structures are normal.  EARS: Normal shape and symmetry.  No tenderness when palpating the mastoid or tragal areas bilaterally.    NOSE: Ley splints are in good placement.  The stitch and Ley splints are removed.  The nasal septum is midline without any evidence of septal hematoma.  The inferior turbinates are well lateralized.  The bilateral nasal cavities were suctioned free.  The patient noticed immediate improvement in the breathing.  The patient tolerated the procedure well.  NEUROLOGIC: Cranial nerves II through XII are grossly intact.  Voice is strong.  Patient is House-Brackman I/VI bilaterally.  CARDIOVASCULAR: Extremities are warm and well-perfused.  No significant peripheral edema.  RESPIRATORY: Patient has nonlabored breathing without cough, wheeze, stridor.  PSYCHIATRIC:  Patient is alert and oriented.  Mood and affect appear normal.  SKIN: Warm and dry.  No scalp, face, or neck lesions noted.     Procedure: Nasal sinus debridement   Indication: Status post endoscopic sinus surgery     To improve medication delivery and assist with healing following sinus surgery, the nasal cavities were examined and debrided.  I proceeded with rigid nasal endoscopic examination and debridement.  The bilateral nasal cavities were anesthetized with 4% lidocaine and phenylephrine spray.  The bilateral nasal cavities were visualized with a 30 degree rigid endoscope.  The right nasal cavity was debrided with a rigid suction, bayonet forceps, and Blakesley forceps.  The patient had a widely patent maxillary antrostomy and ethmoid cavity.  The sphenoethmoid recess was clear.  The frontal sinus outflow area was clear.  The middle turbinate was well medialized without adhesion.  Attention was turned to the left.  The left nasal cavity was debrided with a rigid suction, bayonet forceps, and Blakesley forceps. The patient had a widely patent maxillary antrostomy and ethmoid cavity.  The sphenoethmoid recess was clear.  The frontal sinus outflow area was clear.  The middle turbinate was well medialized without adhesion.  The scope was removed.  The patient tolerated the procedure well.     Assessment and Plan     ICD-10-CM    1. Chronic maxillary sinusitis  J32.0 NASAL/SINUS SCOPE WITH BIOPSY/POLYPECT/DEBRIDE,ENT     mometasone (NASONEX) 50 MCG/ACT nasal spray      2. Chronic ethmoidal sinusitis  J32.2 NASAL/SINUS SCOPE WITH BIOPSY/POLYPECT/DEBRIDE,ENT     mometasone (NASONEX) 50 MCG/ACT nasal spray      3. Facial pressure  R44.8 NASAL/SINUS SCOPE WITH BIOPSY/POLYPECT/DEBRIDE,ENT     mometasone (NASONEX) 50 MCG/ACT nasal spray      4. Hypertrophy of both inferior nasal turbinates  J34.3 NASAL/SINUS SCOPE WITH BIOPSY/POLYPECT/DEBRIDE,ENT     mometasone (NASONEX) 50 MCG/ACT nasal spray      5. Status post  functional endoscopic sinus surgery  Z98.890 NASAL/SINUS SCOPE WITH BIOPSY/POLYPECT/DEBRIDE,ENT     mometasone (NASONEX) 50 MCG/ACT nasal spray      6. Postoperative state  Z98.890 NASAL/SINUS SCOPE WITH BIOPSY/POLYPECT/DEBRIDE,ENT     mometasone (NASONEX) 50 MCG/ACT nasal spray         It was my pleasure seeing Sirisha Mauricio today in clinic.  The patient is healing well after their endoscopic sinus surgery and turbinate reduction. She was successfully debrided today in office. We discussed stopping all nose blowing, lifting, activity restrictions.  She can continue the nasal irrigations with budesonide daily for the next month and then as needed.  I would like to see the patient back in 3-4 months for recheck.  The patient knows to contact me sooner with any problems or concerns.      Javier Zayas MD  Department of Otolaryngology-Head and Neck Surgery  [facility]       Again, thank you for allowing me to participate in the care of your patient.        Sincerely,        Javier Zayas MD

## 2023-08-14 NOTE — NURSING NOTE
This laryngoscopy scope was used on this patient.    Rigid    Willy Storz Rigid #[id number] Adult 0 degree

## 2023-11-12 DIAGNOSIS — J30.89 NON-SEASONAL ALLERGIC RHINITIS DUE TO OTHER ALLERGIC TRIGGER: ICD-10-CM

## 2023-11-12 DIAGNOSIS — J30.2 SEASONAL AND PERENNIAL ALLERGIC RHINOCONJUNCTIVITIS: ICD-10-CM

## 2023-11-12 DIAGNOSIS — L20.89 OTHER ATOPIC DERMATITIS: ICD-10-CM

## 2023-11-12 DIAGNOSIS — J30.9 ALLERGIC RHINITIS WITH POSTNASAL DRIP: ICD-10-CM

## 2023-11-12 DIAGNOSIS — R09.82 ALLERGIC RHINITIS WITH POSTNASAL DRIP: ICD-10-CM

## 2023-11-12 DIAGNOSIS — J30.89 SEASONAL AND PERENNIAL ALLERGIC RHINOCONJUNCTIVITIS: ICD-10-CM

## 2023-11-12 DIAGNOSIS — H10.10 SEASONAL AND PERENNIAL ALLERGIC RHINOCONJUNCTIVITIS: ICD-10-CM

## 2023-11-16 RX ORDER — CRISABOROLE 20 MG/G
OINTMENT TOPICAL 2 TIMES DAILY
Qty: 60 G | Refills: 3 | Status: SHIPPED | OUTPATIENT
Start: 2023-11-16

## 2023-11-16 NOTE — TELEPHONE ENCOUNTER
EUCRISA 2% OINT      Last Written Prescription Date:  12/20/22  Last Fill Quantity: 60,   # refills: 3  Last Office Visit : 12/20/22  Future Office visit:  None     Routing refill request to provider for review/approval because:  EUCRISA 2% OINT   not on the [facility], [facility] or [facility] refill protocol

## 2023-12-26 NOTE — PROGRESS NOTES
Chief Complaint   Patient presents with    Follow Up     History of sinus surgery in 8/2023 -no concerns per patient     History of Present Illness  Sirisha Mauricio is a 23 year old female who presents today for follow-up. The patient went to the operating room on 8/1/2023 for endoscopic sinus surgery and bilateral inferior turbinate reduction.  She was seen for follow-up on 8/7/2023 and 8/14/2023 for follow-up send debridement and she appeared to be healing well.  She returns today for 3 to 4-month follow-up.      From a symptom standpoint, the patient reports that she is doing very well.  She is breathing well through her nose.  She denies any face pain or pressure.  Her headaches have resolved.  No significant rhinorrhea or postnasal drainage.  She has not been as diligent with using nasal medications but overall feels like she is doing quite well.  The patient is otherwise doing well and has no ENT related concerns.      Past Medical History  Patient Active Problem List   Diagnosis    Vitiligo    Pre-syncope    Intrinsic eczema    Chronic maxillary sinusitis    Chronic ethmoidal sinusitis    Chronic frontal sinusitis    Facial pressure    Hypertrophy of both inferior nasal turbinates     Current Medications    Current Outpatient Medications:     budesonide (PULMICORT) 0.5 MG/2ML neb solution, Place 0.5 mg/2 mL budesonide vial in 8 oz normal saline sinus rinse bottle.  Irrigate each nostril with one half of the bottle twice daily., Disp: 360 mL, Rfl: 3    EUCRISA 2 % ointment, APPLY TOPICALLY 2 TIMES DAILY, Disp: 60 g, Rfl: 3    mometasone (NASONEX) 50 MCG/ACT nasal spray, Spray 2 sprays into both nostrils daily, Disp: 52 g, Rfl: 3    Allergies  No Known Allergies    Social History  Social History     Socioeconomic History    Marital status: Single   Tobacco Use    Smoking status: Never    Smokeless tobacco: Never   Vaping Use    Vaping Use: Never used   Substance and Sexual Activity    Alcohol use: No    Drug use:  No    Sexual activity: Never       Family History  Family History   Problem Relation Age of Onset    Allergies Mother     Allergies Sister     Thyroid Disease Sister     Allergies Brother     Allergies Brother     Allergies Maternal Grandmother     Asthma Maternal Grandmother     Allergies Maternal Grandfather     Asthma Maternal Grandfather     Melanoma No family hx of        Review of Systems  As per HPI and PMHx, otherwise 10 system review including the head and neck, constitutional, eyes, respiratory, GI, skin, neurologic, lymphatic, endocrine, and allergy systems is negative.    Physical Exam  /69 (BP Location: Right arm, Patient Position: Chair, Cuff Size: Adult Regular)   Pulse 92   Temp 98.2  F (36.8  C) (Tympanic)   Wt 56.7 kg (125 lb)   BMI 20.80 kg/m    GENERAL: Patient is a pleasant, cooperative 23 year old female in no acute distress.  HEAD: Normocephalic, atraumatic.  Hair and scalp are normal.  EYES: Pupils are equal, round, reactive to light and accommodation.  Extraocular movements are intact.  The sclera nonicteric without injection.  The extraocular structures are normal.  EARS: Normal shape and symmetry.  No tenderness when palpating the mastoid or tragal areas bilaterally.    NOSE: Nares are patent.  Nasal mucosa is pink and moist.  The nasal septum is midline.  The inferior turbinates are well lateralized and reduced.  No nasal cavity masses, polyps, or mucopurulence on anterior rhinoscopy.  NEUROLOGIC: Cranial nerves II through XII are grossly intact.  Voice is strong.  Patient is House-Brackmann I/VI bilaterally.  CARDIOVASCULAR: Extremities are warm and well-perfused.  No significant peripheral edema.  RESPIRATORY: Patient has nonlabored breathing without cough, wheeze, stridor.  PSYCHIATRIC: Patient is alert and oriented.  Mood and affect appear normal.  SKIN: Warm and dry.  No scalp, face, or neck lesions noted.    Procedure: Flexible Nasal Endoscopy  Indication: Status post  endoscopic sinus surgery    To best visualize the sinonasal anatomy and due to the chief complaint and HPI, I proceeded with flexible fiberoptic nasal endoscopy.  The bilateral nasal cavities were anesthetized and decongested with a mixture of lidocaine and neosynephrine.  The bilateral nasal cavities were then examined using flexible fiberoptic nasal endoscope.  The right nasal cavity shows a widely patent maxillary antrostomy and ethmoid cavity.  There is a small amount of polypoid tissue in the right ethmoid cavity without obstruction.  The sphenoethmoid recess is clear.  The frontal sinus outflow area is clear.  The right nasal cavity was without masses or mucopurulence.  The right middle turbinate and middle meatus was normal in appearance.  The sphenoethmoid recess was clear.  The left nasal cavity shows a widely patent maxillary antrostomy and ethmoid cavity.  The sphenoethmoid recess is clear.  The frontal sinus outflow area is clear.  The left nasal cavity was without masses, polyps, or mucopurulence.  The left middle turbinate and middle meatus was normal in appearance.  The sphenoethmoid recess was clear.  The sinonasal mucosa appeared normal.  The nasal septum is essentially midline.  The nasopharynx had a normal appearance with normal Eustachian tube openings and fossa of Rosenmuller bilaterally.  Minimal adenoid tissue.  The scope was removed.  The patient tolerated the procedure well.    Assessment and Plan     ICD-10-CM    1. Chronic maxillary sinusitis  J32.0 NASAL ENDOSCOPY, DIAGNOSTIC      2. Chronic ethmoidal sinusitis  J32.2 NASAL ENDOSCOPY, DIAGNOSTIC      3. Facial pressure  R44.8 NASAL ENDOSCOPY, DIAGNOSTIC      4. Hypertrophy of both inferior nasal turbinates  J34.3 NASAL ENDOSCOPY, DIAGNOSTIC      5. Status post functional endoscopic sinus surgery  Z98.890 NASAL ENDOSCOPY, DIAGNOSTIC      6. Postoperative state  Z98.890 NASAL ENDOSCOPY, DIAGNOSTIC         It was my pleasure seeing Sirisha PEREZ  Luly today in clinic.  The patient has healed well after her endoscopic sinus surgery.  We discussed using nasal irrigation as needed for symptoms in the future.  We discussed the use of a topical nasal steroid spray on a near daily basis to help prevent inflammation.  We discussed a sick sinus plan as needed.  At this point in time, I would recommend observation.  The patient can return to clinic as needed in the future with any problems or concerns.    Javier Zayas MD  Department of Otolaryngology-Head and Neck Surgery  [facility]

## 2023-12-27 ENCOUNTER — OFFICE VISIT (OUTPATIENT)
Dept: OTOLARYNGOLOGY | Facility: CLINIC | Age: 23
End: 2023-12-27
Payer: COMMERCIAL

## 2023-12-27 VITALS
WEIGHT: 125 LBS | SYSTOLIC BLOOD PRESSURE: 112 MMHG | BODY MASS INDEX: 20.8 KG/M2 | HEART RATE: 92 BPM | DIASTOLIC BLOOD PRESSURE: 69 MMHG | TEMPERATURE: 98.2 F

## 2023-12-27 DIAGNOSIS — J34.3 HYPERTROPHY OF BOTH INFERIOR NASAL TURBINATES: ICD-10-CM

## 2023-12-27 DIAGNOSIS — Z98.890 POSTOPERATIVE STATE: ICD-10-CM

## 2023-12-27 DIAGNOSIS — J32.0 CHRONIC MAXILLARY SINUSITIS: Primary | ICD-10-CM

## 2023-12-27 DIAGNOSIS — J32.2 CHRONIC ETHMOIDAL SINUSITIS: ICD-10-CM

## 2023-12-27 DIAGNOSIS — R44.8 FACIAL PRESSURE: ICD-10-CM

## 2023-12-27 DIAGNOSIS — Z98.890 STATUS POST FUNCTIONAL ENDOSCOPIC SINUS SURGERY: ICD-10-CM

## 2023-12-27 PROCEDURE — 31231 NASAL ENDOSCOPY DX: CPT | Performed by: OTOLARYNGOLOGY

## 2023-12-27 PROCEDURE — 99213 OFFICE O/P EST LOW 20 MIN: CPT | Mod: 25 | Performed by: OTOLARYNGOLOGY

## 2023-12-27 ASSESSMENT — PAIN SCALES - GENERAL: PAINLEVEL: NO PAIN (0)

## 2023-12-27 NOTE — LETTER
12/27/2023         RE: Sirisha Mauricio  [address] [place] [address]        Dear Colleague,    Thank you for referring your patient, Sirisha Mauricio, to the [facility]. Please see a copy of my visit note below.    Chief Complaint   Patient presents with     Follow Up     History of sinus surgery in 8/2023 -no concerns per patient     History of Present Illness  Sirisha Mauricio is a 23 year old female who presents today for follow-up. The patient went to the operating room on 8/1/2023 for endoscopic sinus surgery and bilateral inferior turbinate reduction.  She was seen for follow-up on 8/7/2023 and 8/14/2023 for follow-up send debridement and she appeared to be healing well.  She returns today for 3 to 4-month follow-up.      From a symptom standpoint, the patient reports that she is doing very well.  She is breathing well through her nose.  She denies any face pain or pressure.  Her headaches have resolved.  No significant rhinorrhea or postnasal drainage.  She has not been as diligent with using nasal medications but overall feels like she is doing quite well.  The patient is otherwise doing well and has no ENT related concerns.      Past Medical History  Patient Active Problem List   Diagnosis     Vitiligo     Pre-syncope     Intrinsic eczema     Chronic maxillary sinusitis     Chronic ethmoidal sinusitis     Chronic frontal sinusitis     Facial pressure     Hypertrophy of both inferior nasal turbinates     Current Medications    Current Outpatient Medications:      budesonide (PULMICORT) 0.5 MG/2ML neb solution, Place 0.5 mg/2 mL budesonide vial in 8 oz normal saline sinus rinse bottle.  Irrigate each nostril with one half of the bottle twice daily., Disp: 360 mL, Rfl: 3     EUCRISA 2 % ointment, APPLY TOPICALLY 2 TIMES DAILY, Disp: 60 g, Rfl: 3     mometasone (NASONEX) 50 MCG/ACT nasal spray, Spray 2 sprays into both nostrils daily, Disp: 52 g, Rfl: 3    Allergies  No Known  Allergies    Social History  Social History     Socioeconomic History     Marital status: Single   Tobacco Use     Smoking status: Never     Smokeless tobacco: Never   Vaping Use     Vaping Use: Never used   Substance and Sexual Activity     Alcohol use: No     Drug use: No     Sexual activity: Never       Family History  Family History   Problem Relation Age of Onset     Allergies Mother      Allergies Sister      Thyroid Disease Sister      Allergies Brother      Allergies Brother      Allergies Maternal Grandmother      Asthma Maternal Grandmother      Allergies Maternal Grandfather      Asthma Maternal Grandfather      Melanoma No family hx of        Review of Systems  As per HPI and PMHx, otherwise 10 system review including the head and neck, constitutional, eyes, respiratory, GI, skin, neurologic, lymphatic, endocrine, and allergy systems is negative.    Physical Exam  /69 (BP Location: Right arm, Patient Position: Chair, Cuff Size: Adult Regular)   Pulse 92   Temp 98.2  F (36.8  C) (Tympanic)   Wt 56.7 kg (125 lb)   BMI 20.80 kg/m    GENERAL: Patient is a pleasant, cooperative 23 year old female in no acute distress.  HEAD: Normocephalic, atraumatic.  Hair and scalp are normal.  EYES: Pupils are equal, round, reactive to light and accommodation.  Extraocular movements are intact.  The sclera nonicteric without injection.  The extraocular structures are normal.  EARS: Normal shape and symmetry.  No tenderness when palpating the mastoid or tragal areas bilaterally.    NOSE: Nares are patent.  Nasal mucosa is pink and moist.  The nasal septum is midline.  The inferior turbinates are well lateralized and reduced.  No nasal cavity masses, polyps, or mucopurulence on anterior rhinoscopy.  NEUROLOGIC: Cranial nerves II through XII are grossly intact.  Voice is strong.  Patient is House-Brackmann I/VI bilaterally.  CARDIOVASCULAR: Extremities are warm and well-perfused.  No significant peripheral  edema.  RESPIRATORY: Patient has nonlabored breathing without cough, wheeze, stridor.  PSYCHIATRIC: Patient is alert and oriented.  Mood and affect appear normal.  SKIN: Warm and dry.  No scalp, face, or neck lesions noted.    Procedure: Flexible Nasal Endoscopy  Indication: Status post endoscopic sinus surgery    To best visualize the sinonasal anatomy and due to the chief complaint and HPI, I proceeded with flexible fiberoptic nasal endoscopy.  The bilateral nasal cavities were anesthetized and decongested with a mixture of lidocaine and neosynephrine.  The bilateral nasal cavities were then examined using flexible fiberoptic nasal endoscope.  The right nasal cavity shows a widely patent maxillary antrostomy and ethmoid cavity.  There is a small amount of polypoid tissue in the right ethmoid cavity without obstruction.  The sphenoethmoid recess is clear.  The frontal sinus outflow area is clear.  The right nasal cavity was without masses or mucopurulence.  The right middle turbinate and middle meatus was normal in appearance.  The sphenoethmoid recess was clear.  The left nasal cavity shows a widely patent maxillary antrostomy and ethmoid cavity.  The sphenoethmoid recess is clear.  The frontal sinus outflow area is clear.  The left nasal cavity was without masses, polyps, or mucopurulence.  The left middle turbinate and middle meatus was normal in appearance.  The sphenoethmoid recess was clear.  The sinonasal mucosa appeared normal.  The nasal septum is essentially midline.  The nasopharynx had a normal appearance with normal Eustachian tube openings and fossa of Rosenmuller bilaterally.  Minimal adenoid tissue.  The scope was removed.  The patient tolerated the procedure well.    Assessment and Plan     ICD-10-CM    1. Chronic maxillary sinusitis  J32.0 NASAL ENDOSCOPY, DIAGNOSTIC      2. Chronic ethmoidal sinusitis  J32.2 NASAL ENDOSCOPY, DIAGNOSTIC      3. Facial pressure  R44.8 NASAL ENDOSCOPY, DIAGNOSTIC       4. Hypertrophy of both inferior nasal turbinates  J34.3 NASAL ENDOSCOPY, DIAGNOSTIC      5. Status post functional endoscopic sinus surgery  Z98.890 NASAL ENDOSCOPY, DIAGNOSTIC      6. Postoperative state  Z98.890 NASAL ENDOSCOPY, DIAGNOSTIC         It was my pleasure seeing Sirisha Mauricio today in clinic.  The patient has healed well after her endoscopic sinus surgery.  We discussed using nasal irrigation as needed for symptoms in the future.  We discussed the use of a topical nasal steroid spray on a near daily basis to help prevent inflammation.  We discussed a sick sinus plan as needed.  At this point in time, I would recommend observation.  The patient can return to clinic as needed in the future with any problems or concerns.    Javier Zayas MD  Department of Otolaryngology-Head and Neck Surgery  [facility]       Again, thank you for allowing me to participate in the care of your patient.        Sincerely,        Javier Zayas MD

## 2023-12-27 NOTE — NURSING NOTE
"Initial /69 (BP Location: Right arm, Patient Position: Chair, Cuff Size: Adult Regular)   Pulse 92   Temp 98.2  F (36.8  C) (Tympanic)   Wt 56.7 kg (125 lb)   BMI 20.80 kg/m   Estimated body mass index is 20.8 kg/m  as calculated from the following:    Height as of 8/1/23: 1.651 m (5' 5\").    Weight as of this encounter: 56.7 kg (125 lb). .  Alissa Jaramillo LPN    "

## 2023-12-27 NOTE — PATIENT INSTRUCTIONS
Per physician's instructions    NASAL SALINE IRRIGATION INSTRUCTIONS    You will be starting nasal saline irrigations and will need to obtain the following:      - NeilMed Sinus Rinse 8 oz Kit  - Distilled or filtered water   - Normal saline salt packets    Place filtered or distilled water into the NeilMed bottle up to the fill line (DO NOT USE TAP OR WELL WATER).  Place the pre-made salt packet in the 8 oz of saline.  Shake the bottle to suspend into solution.  Lean head forward over a sink or a basin.  Rinse each side of the nose with one-half of the bottle (each squeeze is about one-half of the bottle). Rinse the nose daily.     If you use topical nasal sprays, apply following irrigation.    SICK SINUS PLAN  If you feel like you are getting a sinus infection, obtain oxymetazoline (Afrin) nasal spray.  Oxymetazoline (Afrin) is available over the counter.  Place 2 sprays in each nostril.  Perform nasal saline irrigation in each nostril 20-30 minutes following application of the oxymetazoline spray.  You can use this medication for up to 3 days or 6 doses.  If your sinus symptoms are persistent, please contact your ENT surgeon.    Video example: [email or web address]/[email or web address]

## 2024-02-18 ENCOUNTER — HEALTH MAINTENANCE LETTER (OUTPATIENT)
Age: 24
End: 2024-02-18

## 2025-03-09 ENCOUNTER — HEALTH MAINTENANCE LETTER (OUTPATIENT)
Age: 25
End: 2025-03-09

## (undated) DEVICE — TUBING IRRIGATOR STRAIGHTSHOT XPS 1895522

## (undated) DEVICE — DRAPE SPLIT EENT 76X124" 3X28" 9447

## (undated) DEVICE — SYR EAR BULB 3OZ 0035830

## (undated) DEVICE — TRACKER ENT OTS PATIENT FUSION 9733534

## (undated) DEVICE — BLADE SINUS RAD12 CVD 4MM FUSION ROTATE W/TRACKING

## (undated) DEVICE — TUBING SUCTION 10'X3/16" N510

## (undated) DEVICE — BLADE INFERIOR TURBINATE 2.9MMX11CM 1882940HR

## (undated) DEVICE — PACK MINOR SBA15MIFSE

## (undated) DEVICE — SPECIMEN CONTAINER 4OZ

## (undated) DEVICE — ENDO SHEATH STORZ SHARPSITE ENDOSCRUB 0DEG 4MM 1912000

## (undated) DEVICE — SU ETHILON 3-0 FS-1 18" 669H

## (undated) DEVICE — TRACKER ENT OTS INSTRUMENT FUSION 9733533

## (undated) DEVICE — SPLINT INTRANASAL POSISEPX GEL SPONGE 9210584

## (undated) DEVICE — SOL WATER IRRIG 1000ML BOTTLE 07139-09

## (undated) DEVICE — SPONGE COTTONOID 1/2X3" 80-1407

## (undated) DEVICE — DECANTER TRANSFER DEVICE 2008S

## (undated) DEVICE — TRACKER PATIENT NON-INVASIVE AXIEM 9734887

## (undated) DEVICE — GLOVE BIOGEL PI ULTRATOUCH G SZ 7.5 42175

## (undated) DEVICE — SOL NACL 0.9% INJ 1000ML BAG 07983-09

## (undated) RX ORDER — CEFAZOLIN SODIUM 1 G/3ML
INJECTION, POWDER, FOR SOLUTION INTRAMUSCULAR; INTRAVENOUS
Status: DISPENSED
Start: 2023-08-01

## (undated) RX ORDER — OXYCODONE HYDROCHLORIDE 5 MG/1
TABLET ORAL
Status: DISPENSED
Start: 2023-08-01

## (undated) RX ORDER — TRANEXAMIC ACID 100 MG/ML
INJECTION, SOLUTION INTRAVENOUS
Status: DISPENSED
Start: 2023-08-01

## (undated) RX ORDER — FENTANYL CITRATE 50 UG/ML
INJECTION, SOLUTION INTRAMUSCULAR; INTRAVENOUS
Status: DISPENSED
Start: 2023-08-01

## (undated) RX ORDER — ACETAMINOPHEN 325 MG/1
TABLET ORAL
Status: DISPENSED
Start: 2023-08-01